# Patient Record
Sex: MALE | Race: WHITE | NOT HISPANIC OR LATINO | URBAN - METROPOLITAN AREA
[De-identification: names, ages, dates, MRNs, and addresses within clinical notes are randomized per-mention and may not be internally consistent; named-entity substitution may affect disease eponyms.]

---

## 2024-08-22 ENCOUNTER — MEDICAL CORRESPONDENCE (OUTPATIENT)
Dept: HEALTH INFORMATION MANAGEMENT | Facility: CLINIC | Age: 49
End: 2024-08-22
Payer: COMMERCIAL

## 2024-08-23 ENCOUNTER — REFERRAL (OUTPATIENT)
Dept: TRANSPLANT | Facility: CLINIC | Age: 49
End: 2024-08-23
Payer: COMMERCIAL

## 2024-08-23 DIAGNOSIS — K70.31 ALCOHOLIC CIRRHOSIS OF LIVER WITH ASCITES (H): ICD-10-CM

## 2024-08-23 DIAGNOSIS — K70.30 ALCOHOLIC CIRRHOSIS OF LIVER (H): Primary | ICD-10-CM

## 2024-08-23 NOTE — LETTER
Toro Petersen  [address]  [place] [address]          Dear Toro,    Thank you for your interest in the Transplant Center at [facility]. We look forward to being a part of your care team and assisting you through the transplant process.    As we discussed, your transplant coordinator is Feliciano Diaz (Liver).  You may call your coordinator at any time with questions or concerns.  Your first scheduled call will be on 9/18/2024 between 10:00 AM - 12:50 PM.  If this needs to change, call [phone].    Please complete the following.    Fill out and return the enclosed forms  Authorization for Electronic Communication  Authorization to Discuss Protected Health Information  Authorization for Release of Protected Health Information    Sign up for:  [employer]per[employer]est, access to your electronic medical record (see enclosed pamphlet)  [email or web address], a transplant education website        You can use these tools to learn more about your transplant, communicate with your care team, and track your medical details      Sincerely,      Solid Organ Transplant  [facility]     cc: Referring Physician

## 2024-08-23 NOTE — LETTER
September 18, 2024    Toro Petersen  [address]  [place] [address]    Dear Mr. Petersen,   The purpose of this letter is to let you know that on 9/18/2024 the [facility] Liver Transplant Team reviewed the results of your transplant referral.  Based on the results of your referral and the selection criteria used by our program, the decision was made to close your referral at this time.  This is because you are currently too medically healthy to require or benefit from liver transplant. With your MELD score being 11, you would be exceptionally low on the transplant waiting list.  We have ordered for you to meet with Dr. Judie Roberto at [facility] in [place] for a consultation to discuss your liver care in November. If needed, we can easily re-open your referral for transplant and proceed to a formal evaluation at any time. We have sent orders for you to get monthly labs to watch your MELD score to the [facility].  Important things you should know:  If you would like to discuss the decision, or if your medical status changes you may schedule a return visits with your doctor by calling [phone] and asking to speak to your transplant coordinator.  We recommend that you continue to follow up with your primary care doctor in order to manage your health concerns.  We want you to know that our program has physician and surgeon coverage 24 hours a day, 365 days a year. In addition, our transplant surgeons and physicians will not be on call for two or more transplant programs more than 30 miles apart unless the circumstances have been reviewed and approved by the United Network for Organ Sharing (UNOS) Membership and Professional Standards Committee (MPSC). Finally, our primary physician and primary surgeons are not designated as the primary surgeon or primary physician at more than 1 transplant hospital. If this coverage changes or there are  substantial program changes, you will be notified in writing by letter.   Attached is a letter from UNOS that describes the services and information offered to patients by UNOS and the Organ Procurement and Transplantation Network (OPTN).    Thank you for allowing us to participate in your care.  We wish you well.  Sincerely,  Feliciano Diaz RN  Pre-Liver Transplant Coordinator  [phone] (direct)  [phone] (fax)        Solid Organ Transplant  [facility]    Enclosures: UNOS Letter  cc: Care Team    The Organ Procurement and Transplantation Network Toll-free patient services line:  [phone]  Your resource for organ transplant information    Staffed 8:30 am - 5:00 pm ET Monday - Friday Leave a message 24/7 to receive a call back    The Organ Procurement and Transplantation Network (OPTN) is the national transplant system. It makes the policies that decide how donated organs are matched to patients waiting for a transplant. The OPTN:    Makes sure donated organs get matched to people on the transplant waiting list  Tells people about the donation and transplant processes  Makes sure that the public knows about the need for more organ and tissue donations    The OPTN has a free patient services line that you can call to:  Get more information about:  Organ donation and organ transplants  Donation and transplant policies  Get an information kit with:  A list of transplant hospitals  Waiting list information  Talk about any questions you may have about your transplant hospital or organ procurement organization. The staff will do their best to help you or point you to others who may help.  Find out how you can volunteer with the OPTN and help shape transplant policy     The patient services line number is: 1-054-616-0724    Patient services line staff CANNOT answer questions about your own medical care, including:  Waiting list status  Test  results  Medical records  You will need to call your transplant hospital for this information.    The following websites have more information about transplantation and donation:    OPTN: https://optn.transplant.hrsa.gov/    For potential living donors and transplant recipients:    Living with transplant: https://www.transplantliving.org/    Living donation process: https://optn.transplant.hrsa.gov/living-donation/    Financial assistance: https://www.livingdonorassistance.org/    Transplantation data: https://www.srtr.org/    Organ donation: https://www.organdonor.gov/    Volunteer with the OPTN: https://optn.transplant.hrsa.gov/get-involved/

## 2024-08-23 NOTE — LETTER
PHYSICIAN ORDERS    Faxed to [facility], Attn: Orders at [phone]  DATE & TIME ISSUED: 2024 12:16 PM  PATIENT NAME: Toro Petersen   : 1975     [facility] MR# : [id number]     DIAGNOSIS:  Alcoholic cirrhosis of liver with ascites  ICD-10 CODE: K70.31     Orders  1 year after issue. Please enter as Standing Order to be drawn Once Monthly as needed, for 12 occurrences. These labs must be drawn all together on the same day when done:   INR  (monthly)   CMP  (monthly)        CBC with platelets  (monthly)          Phosphatidyethanol (PEth) (every 3 months, 4 occurrences)        Hemoglobin A1c  (every 3 months, 4 occurrences)       ABO Rh Type & screen (order for one-time order only please)     PLEASE FAX RESULTS AS SOON AS POSSIBLE TO [phone], ATTN:Leonor  FOR CLINICAL QUESTIONS, PLEASE CALL Feliciano Diaz RN at [phone].  Thank you kindly,  .  Hepatology/Liver Transplant  Medical Director, Liver Transplantation  [facility]

## 2024-08-26 VITALS — HEIGHT: 70 IN | BODY MASS INDEX: 34.36 KG/M2 | WEIGHT: 240 LBS

## 2024-08-26 PROBLEM — R16.1 SPLENOMEGALY: Status: ACTIVE | Noted: 2020-01-08

## 2024-08-26 PROBLEM — G47.33 OSA ON CPAP: Status: ACTIVE | Noted: 2023-07-06

## 2024-08-26 PROBLEM — D69.6 THROMBOCYTOPENIA (H): Status: ACTIVE | Noted: 2020-01-06

## 2024-08-26 PROBLEM — E11.9 TYPE 2 DIABETES MELLITUS WITHOUT COMPLICATION, WITH LONG-TERM CURRENT USE OF INSULIN (H): Status: ACTIVE | Noted: 2020-05-20

## 2024-08-26 PROBLEM — K70.31 ALCOHOLIC CIRRHOSIS OF LIVER WITH ASCITES (H): Status: ACTIVE | Noted: 2020-01-23

## 2024-08-26 PROBLEM — Z79.4 TYPE 2 DIABETES MELLITUS WITHOUT COMPLICATION, WITH LONG-TERM CURRENT USE OF INSULIN (H): Status: ACTIVE | Noted: 2020-05-20

## 2024-08-26 PROBLEM — I85.10 SECONDARY ESOPHAGEAL VARICES WITHOUT BLEEDING (H): Status: ACTIVE | Noted: 2020-01-07

## 2024-08-26 RX ORDER — ICOSAPENT ETHYL 1 G/1
2000 CAPSULE ORAL
COMMUNITY
Start: 2024-02-15 | End: 2025-02-19

## 2024-08-26 RX ORDER — INSULIN GLARGINE 300 U/ML
INJECTION, SOLUTION SUBCUTANEOUS
COMMUNITY
Start: 2024-04-12

## 2024-08-26 RX ORDER — ROSUVASTATIN CALCIUM 20 MG/1
20 TABLET, COATED ORAL
COMMUNITY
Start: 2024-08-22

## 2024-08-26 RX ORDER — FUROSEMIDE 40 MG
20 TABLET ORAL
COMMUNITY
Start: 2024-04-14

## 2024-08-26 RX ORDER — MULTIVIT WITH MINERALS/LUTEIN
1000 TABLET ORAL
COMMUNITY

## 2024-08-26 RX ORDER — PROPRANOLOL HCL 60 MG
1 CAPSULE, EXTENDED RELEASE 24HR ORAL DAILY
COMMUNITY
Start: 2024-07-18

## 2024-08-26 RX ORDER — SPIRONOLACTONE 50 MG/1
50 TABLET, FILM COATED ORAL
COMMUNITY
Start: 2024-04-14

## 2024-08-26 RX ORDER — INSULIN ASPART 100 [IU]/ML
INJECTION, SOLUTION INTRAVENOUS; SUBCUTANEOUS
COMMUNITY
Start: 2024-05-16

## 2024-08-26 RX ORDER — MULTIVIT-MIN/IRON/FOLIC ACID/K 18-600-40
CAPSULE ORAL
COMMUNITY

## 2024-08-26 NOTE — TELEPHONE ENCOUNTER
Patient was asked the following questions during liver intake call.     SOT LIVER INTAKE      Referring Provider: Kateryna Gardner MD   Referring Diagnosis: Alcholic cirrhosis of Liver  PCP:  Kateryna Gardner MD      1)Do you know why you have liver disease:Yes      If Alcoholic Cirrhosis is present when was your last drink:  12/2019      Have you ever been through treatment for alcohol: Yes, in 1996 AA classes  2) Presence of Ascites: No Paracentesis: No  3) Presence of Hepatic Encephalopathy: no  Medications: propranolol,rosuvastatin,vitamin E,  furosemide      4) History of GI Bleeding: No  5) Oxygen Use: No  6) EGD: 2/27/2024  7) Colonoscopy: 9/11/2023  8) MELD Score: 11  9)  Labs available for review from PCP/GI:  yes  10)HCC Diagnosis: No (if no, go to #11)                                          11)Insurance information: [facility] [id number] Group [id number]                 Referral intake process completed.  Patient is aware that after financial approval is received, medical records will be requested.   Patient confirmed for a callback from transplant coordinator on 9/18/2024.  Tentative evaluation date TBD.     Confirmed coordinator will discuss evaluation process in more detail at the time of their call.   Patient is aware of the need to arrange age appropriate cancer screening, vaccinations, and dental care.  Reminded patient to complete questionnaire, complete medical records release, and review packet prior to evaluation visit .  Assessed patient for special needs (ie--wheelchair, assistance, guardian, and ): No  Patient instructed to call [phone] with questions.      Patient gave verbal consent during intake call to obtain medical records and documents outside of [facility]:   Yes

## 2024-09-18 NOTE — TELEPHONE ENCOUNTER
"September 18, 2024 10:42 AM - Feliciano Diaz RN:   \"Jaskaran\" Sec[place]  LIVER DISEASE ETOH cirrhosis with ascites  REFERRING PROVIDER: Kateryna Gardner MD   PRIMARY CARE PROVIDER: Kateryna Gardner MD   HEALTH SYSTEM: [facility]  INSURANCE: [employer]  -----------------------------------------------------------------------------------------------------------------------------  MELD 11 (Na 140, Cr 0.68, Albumin 4, T-Bili 1.7, INR 1.3) on 8/19/24       ABO O       HISTORY OF LIVER DISEASE  FIRST DX WITH LIVER DISEASE  1/2020  REASON FOR DIAGNOSIS shut down at work, had BLE swelling    Ascites: yes taking 20mg furosemide every day and 50mg spironolactone every day  Ancelmo NO  TIPS NO  SBP NO  HE/Meds: No HE, No Meds  Kidney function/Dialysis: CKD stage 2, counseled by PCP on avoidance of NSAIDS; no dialysis  Variceal screening Last EGD 2/27/24 at [facility], Yes Varices w/ banding, significant portal HTN w/ splenomegaly. Rec follow up annually.  CRC Screening: [place] done 9/11/2023, normal, repeat in 10 years.  GI Bleed/Hematemesis: Yes GIB, No Hematemesis  Willing to accept blood products/transfusions: Yes has had previous transfusions  HCC Screening US abdomen limit 8/23/24 at [facility] = no focal lesions  Alcohol/Drug use/Legal issues/Chem Dep: last drink 12/2019, mostly drank beer, drank 12-18/week, no drug use, DUI X1, did some classes after DUI    Hospitalizations: hospitalized in 2019 with edema, b  ---------------------------------------------------------------------------------------------------------------------------------  PM  Abdominal surgery: none  Cardiac: \"non compliant with anti lipid therapy\" per 8/22/24 PCP note, Crestor restarted.  Pulmonary/Smoking/O2: MONTSE on CPAP, asthma, never smoked, uses chew, discussed cessation.  Cancer: none  Vaccinations: Vaccinated for Hep A & B, not vaccinated for Covid  Diabetes: Yes DM2, checks BG continuous monitoring, taking insulin, worsening control  Nutrition: appetite fluctuates, slight " weight gain due to inactivity  Dentist: does not have a dentist, last check up 2019.   Mental Health: having some anxiety, no medications/no therapy, talks about it with family.    PAULA lives in [place]. Originally from [place].  Family/social support: single, lives with alone, 2 kids (18 & 19), Mother Mary. Father passed from pancreatic cancer  Work: Not currently working. Last worked 12/2019, was a  at [employer]. Filed for disability.  Education: 12 grade.    ---------------------------------------------------------------------------------------------------------------------------------  LDLT Discussed: yes, discussed in detail, no potential donors immediately identified.    PLAN will plan for Jaskaran to see Dr. Judie Roberto at [facility] on her November schedule, tentatively planning for 11/20/24. Will close referral for now and re-open if needed. Emailed patient educational video links and Living Donor information. Faxed lab orders to [facility], patient will get labs monthly to monitor MELD.  CONTACT VIA PHONE OR [employer] Either works for patient.

## 2024-09-23 NOTE — TELEPHONE ENCOUNTER
"Below from Dr. Roberto about patient's referral:    \"Hi team,   Dr. Cohen ([facility]) called me about this patient, asking if anticoagulation is needed for partial PVT. Patient is being referred for LT. Can you please get images and plan for review with radiology and surgery.     Judie Roberto MD, MPH\"          "

## 2024-10-14 ENCOUNTER — DOCUMENTATION ONLY (OUTPATIENT)
Dept: TRANSPLANT | Facility: CLINIC | Age: 49
End: 2024-10-14
Payer: COMMERCIAL

## 2024-10-14 NOTE — PROGRESS NOTES
[facility] LIVER TUMOR BOARD NOTE  Patient discussed at the multidisciplinary tumor board on 10-. The attendees may consist of representatives from hepatology, oncology, radiation oncology, surgical subspecialty including liver transplant and radiology.     DATE OF TUMOR BOARD: October 11, 2024      SCAN REVIEWED:  Reviewed by Dr. Charles Dimas    Discussion:   -thrombus extends from supermesenteric vein, just about into intrahepatic tip  -significant, non-occlusive, other varices developed  -no lesions seen    Plan: Will review plan for treatment with Dr. Roberto and Dr. Chris Cohen

## 2024-11-18 LAB
ABO/RH(D): NORMAL
ANTIBODY SCREEN: NEGATIVE
SPECIMEN EXPIRATION DATE: NORMAL

## 2024-11-19 ENCOUNTER — OFFICE VISIT (OUTPATIENT)
Dept: TRANSPLANT | Facility: CLINIC | Age: 49
End: 2024-11-19
Attending: INTERNAL MEDICINE
Payer: COMMERCIAL

## 2024-11-19 ENCOUNTER — LAB (OUTPATIENT)
Dept: LAB | Facility: CLINIC | Age: 49
End: 2024-11-19
Attending: INTERNAL MEDICINE
Payer: COMMERCIAL

## 2024-11-19 ENCOUNTER — HOSPITAL ENCOUNTER (OUTPATIENT)
Dept: CARDIOLOGY | Facility: CLINIC | Age: 49
Discharge: HOME | End: 2024-11-19
Attending: INTERNAL MEDICINE
Payer: COMMERCIAL

## 2024-11-19 ENCOUNTER — COMMITTEE REVIEW (OUTPATIENT)
Dept: TRANSPLANT | Facility: CLINIC | Age: 49
End: 2024-11-19

## 2024-11-19 VITALS
HEIGHT: 70 IN | DIASTOLIC BLOOD PRESSURE: 66 MMHG | HEART RATE: 80 BPM | OXYGEN SATURATION: 95 % | WEIGHT: 247.1 LBS | BODY MASS INDEX: 35.37 KG/M2 | SYSTOLIC BLOOD PRESSURE: 97 MMHG | RESPIRATION RATE: 16 BRPM

## 2024-11-19 DIAGNOSIS — K70.31 ALCOHOLIC CIRRHOSIS OF LIVER WITH ASCITES (H): ICD-10-CM

## 2024-11-19 DIAGNOSIS — K70.31 ALCOHOLIC CIRRHOSIS OF LIVER WITH ASCITES (H): Primary | ICD-10-CM

## 2024-11-19 DIAGNOSIS — E66.812 CLASS 2 SEVERE OBESITY DUE TO EXCESS CALORIES WITH SERIOUS COMORBIDITY AND BODY MASS INDEX (BMI) OF 36.0 TO 36.9 IN ADULT (H): ICD-10-CM

## 2024-11-19 DIAGNOSIS — Z76.82 ORGAN TRANSPLANT CANDIDATE: Primary | ICD-10-CM

## 2024-11-19 DIAGNOSIS — K76.6 PORTAL HYPERTENSION (H): ICD-10-CM

## 2024-11-19 DIAGNOSIS — K70.30 ALCOHOLIC CIRRHOSIS OF LIVER (H): ICD-10-CM

## 2024-11-19 DIAGNOSIS — E66.01 CLASS 2 SEVERE OBESITY DUE TO EXCESS CALORIES WITH SERIOUS COMORBIDITY AND BODY MASS INDEX (BMI) OF 36.0 TO 36.9 IN ADULT (H): ICD-10-CM

## 2024-11-19 LAB
A1 AB TITR SERPL: >256 {TITER}
ABO/RH(D): NORMAL
AFP SERPL-MCNC: 6 NG/ML
ALBUMIN MFR UR ELPH: <6 MG/DL
ALBUMIN SERPL BCG-MCNC: 3.8 G/DL (ref 3.5–5.2)
ALBUMIN UR-MCNC: NEGATIVE MG/DL
ALP SERPL-CCNC: 167 U/L (ref 40–150)
ALT SERPL W P-5'-P-CCNC: ABNORMAL U/L
ANION GAP SERPL CALCULATED.3IONS-SCNC: 24 MMOL/L (ref 7–15)
ANTIBODY TITER IGM SCREEN: NEGATIVE
APPEARANCE UR: CLEAR
AST SERPL W P-5'-P-CCNC: ABNORMAL U/L
B IGG TITR SERPL: 64 {TITER}
B IGM TITR SERPL: 16 {TITER}
BILIRUB DIRECT SERPL-MCNC: ABNORMAL MG/DL
BILIRUB SERPL-MCNC: 1 MG/DL
BILIRUB UR QL STRIP: NEGATIVE
BUN SERPL-MCNC: 12.1 MG/DL (ref 6–20)
CALCIUM SERPL-MCNC: 9 MG/DL (ref 8.8–10.4)
CHLORIDE SERPL-SCNC: 103 MMOL/L (ref 98–107)
CHOLEST SERPL-MCNC: 204 MG/DL
CMV IGG SERPL IA-ACNC: <0.2 U/ML
CMV IGG SERPL IA-ACNC: NORMAL
COLOR UR AUTO: ABNORMAL
CREAT SERPL-MCNC: 0.8 MG/DL (ref 0.67–1.17)
CREAT UR-MCNC: 31.9 MG/DL
EBV VCA IGG SER IA-ACNC: >750 U/ML
EBV VCA IGG SER IA-ACNC: POSITIVE
EGFRCR SERPLBLD CKD-EPI 2021: >90 ML/MIN/1.73M2
ERYTHROCYTE [DISTWIDTH] IN BLOOD BY AUTOMATED COUNT: 13 % (ref 10–15)
EST. AVERAGE GLUCOSE BLD GHB EST-MCNC: 180 MG/DL
FASTING STATUS PATIENT QL REPORTED: ABNORMAL
FASTING STATUS PATIENT QL REPORTED: ABNORMAL
FERRITIN SERPL-MCNC: 452 NG/ML (ref 31–409)
GLUCOSE SERPL-MCNC: 434 MG/DL (ref 70–99)
GLUCOSE UR STRIP-MCNC: 1000 MG/DL
HAV AB SER QL IA: REACTIVE
HBA1C MFR BLD: 7.9 %
HBV CORE AB SERPL QL IA: NONREACTIVE
HBV SURFACE AB SERPL IA-ACNC: <3.5 M[IU]/ML
HBV SURFACE AB SERPL IA-ACNC: NONREACTIVE M[IU]/ML
HBV SURFACE AG SERPL QL IA: NONREACTIVE
HCO3 SERPL-SCNC: 9 MMOL/L (ref 22–29)
HCT VFR BLD AUTO: 39.3 % (ref 40–53)
HCV AB SERPL QL IA: NONREACTIVE
HDLC SERPL-MCNC: 20 MG/DL
HGB BLD-MCNC: ABNORMAL G/DL
HGB UR QL STRIP: NEGATIVE
HIV 1+2 AB+HIV1 P24 AG SERPL QL IA: NONREACTIVE
INR PPP: 1.91 (ref 0.85–1.15)
IRON BINDING CAPACITY (ROCHE): NORMAL
IRON SATN MFR SERPL: NORMAL %
IRON SERPL-MCNC: 82 UG/DL (ref 61–157)
KETONES UR STRIP-MCNC: NEGATIVE MG/DL
LDLC SERPL CALC-MCNC: ABNORMAL MG/DL
LEUKOCYTE ESTERASE UR QL STRIP: NEGATIVE
LVEF ECHO: NORMAL
MCH RBC QN AUTO: ABNORMAL PG
MCHC RBC AUTO-ENTMCNC: ABNORMAL G/DL
MCV RBC AUTO: 89 FL (ref 78–100)
NITRATE UR QL: NEGATIVE
NONHDLC SERPL-MCNC: 184 MG/DL
PH UR STRIP: 5.5 [PH] (ref 5–7)
PHOSPHATE SERPL-MCNC: 3.4 MG/DL (ref 2.5–4.5)
PLATELET # BLD AUTO: 52 10E3/UL (ref 150–450)
POTASSIUM SERPL-SCNC: 4.6 MMOL/L (ref 3.4–5.3)
PROT SERPL-MCNC: 6.9 G/DL (ref 6.4–8.3)
PROT/CREAT 24H UR: NORMAL MG/G{CREAT}
PSA SERPL DL<=0.01 NG/ML-MCNC: 0.31 NG/ML (ref 0–3.5)
RBC # BLD AUTO: 4.44 10E6/UL (ref 4.4–5.9)
SODIUM SERPL-SCNC: 136 MMOL/L (ref 135–145)
SP GR UR STRIP: 1.02 (ref 1–1.03)
SPECIMEN EXPIRATION DATE: NORMAL
SPECIMEN EXPIRATION DATE: NORMAL
T PALLIDUM AB SER QL: NONREACTIVE
TRIGL SERPL-MCNC: 1678 MG/DL
TSH SERPL DL<=0.005 MIU/L-ACNC: 1.8 UIU/ML (ref 0.3–4.2)
UROBILINOGEN UR STRIP-MCNC: NORMAL MG/DL
WBC # BLD AUTO: 4.6 10E3/UL (ref 4–11)

## 2024-11-19 PROCEDURE — 84155 ASSAY OF PROTEIN SERUM: CPT | Performed by: PATHOLOGY

## 2024-11-19 PROCEDURE — 86644 CMV ANTIBODY: CPT | Performed by: INTERNAL MEDICINE

## 2024-11-19 PROCEDURE — G0480 DRUG TEST DEF 1-7 CLASSES: HCPCS | Mod: 90 | Performed by: PATHOLOGY

## 2024-11-19 PROCEDURE — 80069 RENAL FUNCTION PANEL: CPT | Performed by: PATHOLOGY

## 2024-11-19 PROCEDURE — 93000 ELECTROCARDIOGRAM COMPLETE: CPT | Performed by: INTERNAL MEDICINE

## 2024-11-19 PROCEDURE — 86900 BLOOD TYPING SEROLOGIC ABO: CPT

## 2024-11-19 PROCEDURE — 86886 COOMBS TEST INDIRECT TITER: CPT

## 2024-11-19 PROCEDURE — 83036 HEMOGLOBIN GLYCOSYLATED A1C: CPT | Performed by: INTERNAL MEDICINE

## 2024-11-19 PROCEDURE — 82728 ASSAY OF FERRITIN: CPT | Performed by: PATHOLOGY

## 2024-11-19 PROCEDURE — 85041 AUTOMATED RBC COUNT: CPT | Performed by: PATHOLOGY

## 2024-11-19 PROCEDURE — 87340 HEPATITIS B SURFACE AG IA: CPT | Performed by: INTERNAL MEDICINE

## 2024-11-19 PROCEDURE — 86706 HEP B SURFACE ANTIBODY: CPT | Performed by: INTERNAL MEDICINE

## 2024-11-19 PROCEDURE — 82105 ALPHA-FETOPROTEIN SERUM: CPT | Performed by: INTERNAL MEDICINE

## 2024-11-19 PROCEDURE — 85014 HEMATOCRIT: CPT | Performed by: PATHOLOGY

## 2024-11-19 PROCEDURE — 85048 AUTOMATED LEUKOCYTE COUNT: CPT | Performed by: PATHOLOGY

## 2024-11-19 PROCEDURE — 82247 BILIRUBIN TOTAL: CPT | Performed by: PATHOLOGY

## 2024-11-19 PROCEDURE — 86850 RBC ANTIBODY SCREEN: CPT

## 2024-11-19 PROCEDURE — 86481 TB AG RESPONSE T-CELL SUSP: CPT | Performed by: INTERNAL MEDICINE

## 2024-11-19 PROCEDURE — 86708 HEPATITIS A ANTIBODY: CPT | Performed by: INTERNAL MEDICINE

## 2024-11-19 PROCEDURE — 83550 IRON BINDING TEST: CPT | Performed by: PATHOLOGY

## 2024-11-19 PROCEDURE — 93306 TTE W/DOPPLER COMPLETE: CPT

## 2024-11-19 PROCEDURE — G0103 PSA SCREENING: HCPCS | Performed by: PATHOLOGY

## 2024-11-19 PROCEDURE — G0463 HOSPITAL OUTPT CLINIC VISIT: HCPCS | Performed by: TRANSPLANT SURGERY

## 2024-11-19 PROCEDURE — 86665 EPSTEIN-BARR CAPSID VCA: CPT | Performed by: INTERNAL MEDICINE

## 2024-11-19 PROCEDURE — 83540 ASSAY OF IRON: CPT | Performed by: PATHOLOGY

## 2024-11-19 PROCEDURE — 36415 COLL VENOUS BLD VENIPUNCTURE: CPT | Performed by: PATHOLOGY

## 2024-11-19 PROCEDURE — 86803 HEPATITIS C AB TEST: CPT | Performed by: INTERNAL MEDICINE

## 2024-11-19 PROCEDURE — 86704 HEP B CORE ANTIBODY TOTAL: CPT | Performed by: INTERNAL MEDICINE

## 2024-11-19 PROCEDURE — 86780 TREPONEMA PALLIDUM: CPT | Performed by: INTERNAL MEDICINE

## 2024-11-19 PROCEDURE — 84443 ASSAY THYROID STIM HORMONE: CPT | Performed by: PATHOLOGY

## 2024-11-19 PROCEDURE — 84075 ASSAY ALKALINE PHOSPHATASE: CPT | Performed by: PATHOLOGY

## 2024-11-19 PROCEDURE — 85610 PROTHROMBIN TIME: CPT | Performed by: PATHOLOGY

## 2024-11-19 PROCEDURE — 85049 AUTOMATED PLATELET COUNT: CPT | Performed by: PATHOLOGY

## 2024-11-19 PROCEDURE — 80061 LIPID PANEL: CPT | Performed by: PATHOLOGY

## 2024-11-19 PROCEDURE — 99000 SPECIMEN HANDLING OFFICE-LAB: CPT | Performed by: PATHOLOGY

## 2024-11-19 NOTE — PROGRESS NOTES
"\"Much or all of the text in this note was generated through the use of Dragon Dictate voice to text software. Errors in spelling or words which appear to be out of context are unintentional, may be present due having escaped editing\"    Assessment and Plan:  1. liver transplant evaluation - patient is a good candidate overall. Benefits and surgical risks of a liver transplantation were discussed.  2.  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 Portal Vein:Portal vein thrombus - partial, on anticoagulation and SMV open  2. Hepatic Artery: Open  3. TIPS: absent  4. Previous Abdominal Surgery: No  5. Hepatocellular Carcinoma: None  6. Ascites: Present - minimal  7. Costal Angle: wide  8. Portopulmonary Hypertension: absent  9. Hepatopulmonary Syndrome: absent  10. Cardiac Evaluation: needs stress echocardiogram  11. Nutritional Status: Moderate  12. Diabetes: yes, type II on insulin  13.Hypertension no  14. Smoker:no  14: Fraility index:no  15. Meets guidelines to receive Living Donor  No, due his weight >110 Kg  16. Potential Living donors No  MELD 3.0: 13 at 11/19/2024  7:19 AM  MELD-Na: 15 at 11/19/2024  7:19 AM  Calculated from:  Serum Creatinine: 0.8 mg/dL (Using min of 1 mg/dL) at 11/19/2024  7:19 AM  Serum Sodium: 136 mmol/L at 11/19/2024  7:19 AM  Total Bilirubin: 1 mg/dL at 11/19/2024  7:19 AM  Serum Albumin: 3.8 g/dL (Using max of 3.5 g/dL) at 11/19/2024  7:19 AM  INR(ratio): 1.91 at 11/19/2024  7:19 AM  Age at listing (hypothetical): 49 years  Sex: Male at 11/19/2024  7:19 AM      Recommendations: None  #1.  Full cardiac workup in view of his diabetes and obesity  #2.  Consider losing weight to meet guidelines for living donor transplant  #3.  His MELD sodium today is 15 and would certainly qualify for listing at this time      Patients overall evaluation will be discussed at the Liver Transplant selection committee meeting with a final recommendation on the patients suitability for " transplant to be made at that time.    Consult Full  Details:  Toro Petersen was seen in consultation at the request of Dr. Loi Christine and Dr. Judie Roberto for evaluation as a potential liver transplant recipient.    Reason for Visit:  Toro Petersen is a 49 year old year old male with metabolic associated liver disease, who presents for liver transplant evaluation.    HPI:  Presenting complaint: Encephatopathy     Patient was diagnosed to have liver disease approximately in January 2024.  He was seen for variceal bleeding.  Initially he received blood transfusions and subsequently Dr. Loi Christine did an endoscopy and banding for him.  He is also noticed encephalopathy which is disabling.  He has very minimal ascites.  No spontaneous bacterial peritonitis.  He does have easy fatigability and weakness.  He is not currently working since 2020.  He feels the liver disease is disabling his quality of life.  He has tried Ozempic in the past with good weight loss.    He does not give any history of chest pain or any symptoms of coronary artery disease.  No history of lung disease.  He is otherwise very active.    He did not have a caregiver at this time but says that he has a son that could certainly help him.      No past medical history on file.  No past surgical history on file.  No past surgical history on file.  No family history on file.  Allergies   Allergen Reactions    Aspirin Difficulty breathing     Prior to Admission medications    Medication Sig Start Date End Date Taking? Authorizing Provider   furosemide (LASIX) 40 MG tablet Take 20 mg by mouth. 4/14/24   Reported, Patient   icosapent ethyl (VASCEPA) 1 g CAPS capsule Take 2,000 mg by mouth. 2/15/24 2/19/25  Reported, Patient   insulin aspart (NOVOLOG FLEXPEN) 100 UNIT/ML pen INJECT 36 UNITS UNDER THE SKIN 3 TIMES PER DAY BEFORE MEALS 5/16/24   Reported, Patient   insulin glargine U-300 (TOUJEO SOLOSTAR) 300 UNIT/ML (1 units dial) pen INJECT 80  "UNITS UNDER THE SKIN EVERY NIGHT AT BEDTIME 4/12/24   Reported, Patient   propranolol ER (INDERAL LA) 60 MG 24 hr capsule Take 1 capsule by mouth daily. 7/18/24   Reported, Patient   rosuvastatin (CRESTOR) 20 MG tablet Take 20 mg by mouth. 8/22/24   Reported, Patient   spironolactone (ALDACTONE) 50 MG tablet Take 50 mg by mouth. 4/14/24   Reported, Patient   Vitamin D, Cholecalciferol, 50 MCG (2000 UT) CAPS Take by mouth.    Reported, Patient   vitamin E (TOCOPHEROL) 1000 units (450 mg) capsule Take 1,000 Units by mouth.    Reported, Patient       Previous Transplant Hx: No    Cardiovascular Hx:       h/o Cardiac Issues: No       Exercise Tolerance: shortness of breath with exertion.    Potential Donor(s): No    ROS:    REVIEW OF SYSTEMS (check box if normal)  [x]                GENERAL  [x]                  PULMONARY [x]                 GENITOURINARY  [x]                 CNS                 [x]                  CARDIAC  [x]                  ENDOCRINE  [x]                 EARS,NOSE,THROAT [x]                  GASTROINTESTINAL [x]                  NEUROLOGIC    [x]                 MUSCLOSKELTAL  [x]                   HEMATOLOGY    Examination:     Vitals:  BP 97/66 (BP Location: Left arm, Patient Position: Sitting, Cuff Size: Adult Large)   Pulse 80   Resp 16   Ht 1.765 m (5' 9.5\")   Wt 112.1 kg (247 lb 1.6 oz)   SpO2 95%   BMI 35.97 kg/m      GENERAL APPEARANCE: alert and no distress  EYES: PERRL  HENT: mouth without ulcers or lesions  NECK: supple, no adenopathy  RESP: lungs clear to auscultation - no rales, rhonchi or wheezes  CV: regular rhythm, normal rate, no rub   ABDOMEN:  soft, nontender, no HSM or masses and bowel sounds normal  MS: extremities normal- no gross deformities noted, no evidence of inflammation in joints, no muscle tenderness  SKIN: no rash  NEURO: Normal strength and tone, sensory exam grossly normal, mentation intact and speech normal  PSYCH: mentation appears normal. and affect " normal/bright      Results:   Recent Results (from the past week)   Lipid Profile    Collection Time: 11/19/24  7:19 AM   Result Value Ref Range    Cholesterol 204 (H) <200 mg/dL    Triglycerides 1,678 (H) <150 mg/dL    Direct Measure HDL 20 (L) >=40 mg/dL    LDL Cholesterol Calculated      Non HDL Cholesterol 184 (H) <130 mg/dL    Patient Fasting > 8hrs? Unknown    Basic metabolic panel    Collection Time: 11/19/24  7:19 AM   Result Value Ref Range    Sodium 136 135 - 145 mmol/L    Potassium 4.6 3.4 - 5.3 mmol/L    Chloride 103 98 - 107 mmol/L    Carbon Dioxide (CO2) 9 (LL) 22 - 29 mmol/L    Anion Gap 24 (H) 7 - 15 mmol/L    Urea Nitrogen 12.1 6.0 - 20.0 mg/dL    Creatinine 0.80 0.67 - 1.17 mg/dL    GFR Estimate >90 >60 mL/min/1.73m2    Calcium 9.0 8.8 - 10.4 mg/dL    Glucose 434 (H) 70 - 99 mg/dL    Patient Fasting > 8hrs? Unknown    Hepatic panel    Collection Time: 11/19/24  7:19 AM   Result Value Ref Range    Protein Total 6.9 6.4 - 8.3 g/dL    Albumin 3.8 3.5 - 5.2 g/dL    Bilirubin Total 1.0 <=1.2 mg/dL    Alkaline Phosphatase 167 (H) 40 - 150 U/L    AST      ALT      Bilirubin Direct     Ferritin    Collection Time: 11/19/24  7:19 AM   Result Value Ref Range    Ferritin 452 (H) 31 - 409 ng/mL   Hemoglobin A1c    Collection Time: 11/19/24  7:19 AM   Result Value Ref Range    Estimated Average Glucose 180 (H) <117 mg/dL    Hemoglobin A1C 7.9 (H) <5.7 %   Iron and iron binding capacity    Collection Time: 11/19/24  7:19 AM   Result Value Ref Range    Iron 82 61 - 157 ug/dL    Iron Binding Capacity      Iron Sat Index     Phosphorus    Collection Time: 11/19/24  7:19 AM   Result Value Ref Range    Phosphorus 3.4 2.5 - 4.5 mg/dL   Prostate spec antigen screen    Collection Time: 11/19/24  7:19 AM   Result Value Ref Range    Prostate Specific Antigen Screen 0.31 0.00 - 3.50 ng/mL   TSH with free T4 reflex    Collection Time: 11/19/24  7:19 AM   Result Value Ref Range    TSH 1.80 0.30 - 4.20 uIU/mL   INR     Collection Time: 11/19/24  7:19 AM   Result Value Ref Range    INR 1.91 (H) 0.85 - 1.15   CBC with platelets    Collection Time: 11/19/24  7:19 AM   Result Value Ref Range    WBC Count 4.6 4.0 - 11.0 10e3/uL    RBC Count 4.44 4.40 - 5.90 10e6/uL    Hemoglobin      Hematocrit 39.3 (L) 40.0 - 53.0 %    MCV 89 78 - 100 fL    MCH      MCHC      RDW 13.0 10.0 - 15.0 %    Platelet Count 52 (L) 150 - 450 10e3/uL   Adult Type and Screen    Collection Time: 11/19/24  7:19 AM   Result Value Ref Range    ABO/RH(D) O POS     Antibody Screen Negative Negative    SPECIMEN EXPIRATION DATE [id number]    ABO and Rh    Collection Time: 11/19/24  7:28 AM   Result Value Ref Range    ABO/RH(D) O POS     SPECIMEN EXPIRATION DATE [id number]      I had a long discussion with the patient regarding liver transplantation which included but was not limited to  the following points:    Liver transplant selection committee process.  The federal rules for cadaveric waiting list, the size and blood type matching of the organ. The availability of living-related donor transplantation.  The types of donors: brain death donors, non-heart beating donors, partial liver grafts: splits and living donor grafts  Extended criteria  Donors (older age, steasosis) and the increased  risk of primary non-function using the extended criteria donors  [facility] high risk donors,  Risk of donor transmitted infections and donor transmitted malignancy  The liver transplant operation and the associated risks and technical complications which can include intraoperative death, post operative death,  Primary non-function, bleeding requiring re-operations, arterial and biliary complications, bowel perforations, and intra abdominal abscess. Some of these complicaitons may require a second operation.  The postoperative course, the ICU stay and risk of postoperative complications which can include sepsis, MI, stroke, brain injury, pneumonia, pleural effusions, and renal  dysfunction.  The current 1 year and 5 year graft and patient survivals.  The need for life long immunosuppressive therapy and the side effects of these medications, including the possibility of toxicity, opportunistic infections, risk of cancer including lymphoma, and the possibility of rejection even if the patient is taking the medication exactly as prescribed.  The need for compliance with medications and follow-up visits in the clinic and thereafter.  The patient and family understand these risks and wish to proceed to transplantation     Review of prior external note(s) from - Outside records from [place]  60 minutes spent by me on the date of the encounter doing chart review, history and exam, documentation and further activities per the note

## 2024-11-19 NOTE — COMMITTEE REVIEW
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    Committee Discussion Details: Complete Evaluation.     Needs:   Doppler US every 3 months.   Endocrinology referral to be done locally.  Needs to lose weight, check with insurance to see if he can get back on meds.   Needs caregiver plan.   Better DM control.  RTC with Dr. Roberto 2/11.    Committee Review Members:  Nutrition Yumiko Mcpherson, RD, Linda Whitten, RD   Pharmacist Kamila Perez, [facility]    - Clinical Kathy Hightower MSW, MARCO Farley, Amy Rodriges, [facility]   Transplant Samantha Tate, RN, Angelina Soto, RN, Apurva Ferreira, RN, Jr Abebe Cee, NICHO, Klaus Mckenzie, RN, Feliciano Diaz, RN, Rhonda Coffey, RN   Transplant Hepatology  Yuliya Phillips MD, Judie Roberto MD, Antonietta Rutledge MD, Ghislaine De La Cruz MD, Thomas M. Leventhal, MD   Transplant Surgery Mak Longo MD, Manjinder Gallegos MD, Ramon uW MD, Alvin Junior MD

## 2024-11-19 NOTE — PROGRESS NOTES
Psychosocial Assessment    Toro Petersen was seen in the Transplant Center as part of his evaluation as a potential liver transplant recipient .  He presented with alone today in clinic.     Living Situation: Toro is a 49 year old male who lives in a single family home in  [place], [place]. Toro resides with his his adult son, Vineet (19). He does not report any concerns related to their living environment.     [place], [place] is about 350 miles away from our transplant center. We discussed requirement to remain within 50 miles of our transplant center post discharge from the hospital for about a month with a caregiver. We reviewed lodging and funding options. At this time, they do not have an adequate plan to remain local post transplant.  Obie has funding through his insurance that could assist with a local stay. He has a friend and family member who live closer who he will speak to about staying with them. He also does not have a caregiver for his local stay.     Education/Employment:  Toro  obtained his GED . He is no longer working due to their health. He last worked in 2019 as a  . Toro is not a .     Financial /Income: Obie does not have a source of income at this time. He reports being financially stable. He indicated that he is able to financially sustain himself and family for about 10 more years.     Health Insurance: He has [employer].     This writer talked with Toro about the financial risks of transplant, particularly about the high cost of transplant related medications and the importance of maintaining adequate health insurance coverage.    Family/Social Support: Toro is single and has two children, Vineet who lives with him and Hayley who is enlisted in the . He has one sister, Nuvia ([place], [place]). His mother Mary is living and lives between his sisters home and his home. He identified his nephew, Forrest and friend Sumanth who are supportive.     At this  time, he does not have an appropriate caregiver plan.      This writer stressed the importance of having a stable and involved support network before and after transplant.  Provided Toro with education about the relationship between a stable support system and better surgical and post-transplant outcomes compared to patients with a limited support system.      Functional Status: Toro is independent with all ADL/IADLs. He manages their medications. . He continues to drive.     Chemical Dependency:  Obie's last consumption of alcohol was December 2019. Prior to that he was drinking 2-3 days a week, and would fluctuate between 6-24 beers a day. He reports that he discontinued his drinking due to his health. Obie continues to chew tobacco and goes through about five tins a month. No history of misuse/over use of pain medication. No other illicit substance use reported other than recreational marijuana use in his teens.     Obie does not have a history of any treatment related to substances. He has a history of one DUI.     Mental Health: Toro denies any history of being diagnosed with a mental health disorder. he has no history of seeing a therapist or being prescribed medication for his mental health.  Toro denies any history of hospitalization for mental health treatment.       Adjustment to Illness:  This writer provided Toro with supportive counseling throughout this interview.  This writer also encouraged Toro to attend the liver transplant support group for additional support and encouragement.     Impression/Recommendations:   Toro verbalizes understanding the psychosocial risks of transplant and teaching provided during this evaluation.    Toro last consumption of alcohol was December 2019. He has meet the sobriety criteria recommended for listing. At this time, there are no further recommendations for a substance use assessment or treatment as he is about 5 years sober. Lillian  from 9/25 was negative. Today his Peth is pending.     Obie does not have a caregiver plan at this time. He will discuss this with his family and friends.     Toro has adequate finances and health insurance for transplant, and an intact support system. This writer will remain available to assist patient throughout the evaluation process and will follow patient through transplant if he is listed.  It was a pleasure to evaluate this patient for liver transplant.    Pending his caregiver plan, Toro is a acceptable transplant candidate from a psychosocial perspective.      Teaching completed during assessment:  1.     Housing and relocation needs post transplant.  2.     Caregiver needs post transplant.  3.     Financial issues related to transplant.  4.     Risks of alcohol use post transplant.  5.     Common psychosocial stressors pre/post transplant.        6.     Liver Transplant support group availability.        7.     [employer] Directive - I discussed a health care directive with him today. He has one at [facility] and reports that he named his nephew as his primary health care agent.              Psychosocial Risks of Transplant Reviewed:  1.     Increased stress related to your emotional, family, social, employment, or   financial situation.  2.     Affect on work and/or disability benefits.  3.     Affect on future health and life insurance.  4.     Transplant outcome expectations may not be met.  5.     Mental Health risks: anxiety, depression, PTSD, guilt, grief and chronic fatigue.     IGANNI Dorado

## 2024-11-19 NOTE — LETTER
"11/19/2024      Toro Petersen  [address]   [address]  [place] [address]      Dear Colleague,    Thank you for referring your patient, Toro Petersen, to the [facility] TRANSPLANT [facility]. Please see a copy of my visit note below.    [facility] Liver Transplant Evaluation    Requesting Provider and Health System: Chris Piastephanie  Liver Disease: ARLD    A/P  Mr. Petersen 49 year old male with alcohol related cirrhosis. MELD 13 ABO O      HE His CC with liver disease is \"brain fog\". In response to this, I am weaning him off NSBB, adding rifaximin. He already has 4-5 BM per day, so will not add lactulose.    PV THROMBOSIS, PARTIAL stay on warfarin x 3 mo, goal INR 2-3. He will be on for 3 mo then will reassess with US with dopplers.    THROMBOCYTOPENIA 2/2 portal HTN. Plt ~50s.    CRAMPING  1. tonic water (diet)  2. magnesium supplement  3. zinc 220 bid  4. vitamin E 200 mg po tid  If none of these works, will have to cut back on diuretics.    HCC SCREENING UTD CT 9/2024. No mass. US today.  VARICEAL SCREENING UTD with small varices 2/2024. I am stopping his NSBB and he will get next EGD 2/2025.    IRON TESTING Noted ferritin 452, and away from alcohol for 5 y. Sat 11 2/15/24.    PORTAL HTN He has had EV bleed 5 years ago and now with grade 1 varices. Given his symptoms of dizziness, black spots I would like him to wean off the propranolol. He is on Inderal LA 60. I have prescribed inderal 20 mg (not long acting) and have instructed him to take 20 bid for 1 week, then decrease to 20 mg daily.     BONE HEALTH no assessment. DEXA scheduled 2/2025  DENTAL HEALTH Poor dentition. Last dental visit 2019. Needs dental care.   COLORECTAL CANCER SCREENING  9/11/2023, normal, repeat in 10 years.     DM on insulin. A1C 7. Knows he needs better control.    COVID VACCINATION Discussed that this is recommended for LT. He declines  OTHER VACCINATION influenza, HAV HBV. He declines    NUTRITIONAL STATUS No " malnutrition    SOCIAL SUPPORT Lives with son, describes nephews in [place]  FUNCTIONAL STATUS Indep in ADLs.  LIVER TRANSPLANT CANDIDACY MELD is low. Will complete eval and will discuss,leonel would not activate.        RTC 2/11/25 in person in [place]. US with doppler at that time.    Judie Roberto MD  Hepatology/Liver Transplantation  Medical Director, Liver Transplantation  [facility]  ========================================================================    Subjective:  Mr. Petersen is a 49 Y M with decompensated alcohol related cirrhosis with EV bleed, ascites/LOPEZ. First diagnosed with liver disease in 2/2020 when he had BLE and he had EV bleeding. He had already stopped alcohol prior to that. MELD 13 ABO O    US 8/2024 showed partial PV thrombosis. CT venogram 9/11/24: Nonocclusive eccentric thrombus within the central superior mesenteric vein and main portal vein. He was started on warfarin in October/November 2024    He states his biggest problem is brain fog. He has not been on any medications.  He gets foggy/dizzy when he bends over and sees spots. He also gets this when he makes sudden movements.  He is on propranolol ER 60 mg.    HE has LOPEZ which is mostly controlled with diuretics. Occasional abdominal swelling when he consumes more sodium    Ascites/LE  spironolactone  100  furosemide 40  Na restricted diet: tries to stay under 2000, gives himself a B+.  Paracentesis: none  HE  Lactulose none  Rifaximin none  AUD  Last ETOH 12/2019. Stopped because it just didn't taste right.  Typical pattern was mostly drank beer, drank 12-18/week, no drug use, DUI X1, did some classes after DUI     HCC screening CT 9/12/24 no mass.  EGD 2/27/24 small varices, HH. Repeat 1 y.  COLONOSCOPY  9/11/2023, normal, repeat in 10 years.   KIDNEY FUNCTION normal  BONE DENSITY no record    Portal vein assessment: see above  Diabetes: yes  Abdominal surgery: none  HCV neg  HBV neg  HIV neg  Proph: does not get  "flu shot    MELD 3.0: 13 at 11/19/2024  7:19 AM  MELD-Na: 15 at 11/19/2024  7:19 AM  Calculated from:  Serum Creatinine: 0.8 mg/dL (Using min of 1 mg/dL) at 11/19/2024  7:19 AM  Serum Sodium: 136 mmol/L at 11/19/2024  7:19 AM  Total Bilirubin: 1 mg/dL at 11/19/2024  7:19 AM  Serum Albumin: 3.8 g/dL (Using max of 3.5 g/dL) at 11/19/2024  7:19 AM  INR(ratio): 1.91 at 11/19/2024  7:19 AM  Age at listing (hypothetical): 49 years  Sex: Male at 11/19/2024  7:19 AM    PAST MEDICAL HISTORY  HLD on Crestor  MONTSE on CPAP  DM2 dx 4/2020, on insulin, A1C 7 9/25/24. Wears a monitor ([employer])  SOCIAL HISTORY  Lives with son age 19, 3 cats  Currently is not working. Last worked December 2019 due to \"brain fog and balance\". Worked as . Not on disability  Smoking: Never  Alcohol: see above  FAMILY HISTORY  M alive, COPD, smoker  F d pancreatic cancer age 73  Sibs: 1 older sister skin cancer, COPD, smoker  Children 18 and 19 dtr and son (2024)  ROS 10 point ROS neg other than the symptoms noted above in the HPI.  EXAM  97/66 80 16 95%  kg, BMI 36  Gen: No acute distress  Head: Normocephalic atraumatic. Poor dentition  Eyes: Sclera anicteric  Neck: No cervical lymphadenopathy  Respiratory: Clear to auscultation bilaterally, no overt wheezing or rales  CV: RRR   Abdomen:  Soft, nontender, nondistended, normal bowel sounds.  Extrem: No edema  Skin: No jaundice, spider angiomata or palmar erythema.  No rashes.   Neuro: Alert and oriented. No asterixis.  Tremor B  MSK: Grossly Intact  Psych: Normal speech, normal affect    Time today in minutes 130  Record review 20  Communication with care team 15  Face to face with patient 55  Documentation 20             Again, thank you for allowing me to participate in the care of your patient.        Sincerely,        Judie Roberto MD  "

## 2024-11-19 NOTE — LETTER
"11/19/2024      Toro Petersen  [address]   [address]  [place] [address]      Dear Colleague,    Thank you for referring your patient, Toro Petersen, to the [facility] TRANSPLANT [facility]INIC. Please see a copy of my visit note below.    \"Much or all of the text in this note was generated through the use of Dragon Dictate voice to text software. Errors in spelling or words which appear to be out of context are unintentional, may be present due having escaped editing\"    Assessment and Plan:  1. liver transplant evaluation - patient is a good candidate overall. Benefits and surgical risks of a liver transplantation were discussed.  2.  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 Portal Vein:Portal vein thrombus - partial, on anticoagulation and SMV open  2. Hepatic Artery: Open  3. TIPS: absent  4. Previous Abdominal Surgery: No  5. Hepatocellular Carcinoma: None  6. Ascites: Present - minimal  7. Costal Angle: wide  8. Portopulmonary Hypertension: absent  9. Hepatopulmonary Syndrome: absent  10. Cardiac Evaluation: needs stress echocardiogram  11. Nutritional Status: Moderate  12. Diabetes: yes, type II on insulin  13.Hypertension no  14. Smoker:no  14: Fraility index:no  15. Meets guidelines to receive Living Donor  No, due his weight >110 Kg  16. Potential Living donors No  MELD 3.0: 13 at 11/19/2024  7:19 AM  MELD-Na: 15 at 11/19/2024  7:19 AM  Calculated from:  Serum Creatinine: 0.8 mg/dL (Using min of 1 mg/dL) at 11/19/2024  7:19 AM  Serum Sodium: 136 mmol/L at 11/19/2024  7:19 AM  Total Bilirubin: 1 mg/dL at 11/19/2024  7:19 AM  Serum Albumin: 3.8 g/dL (Using max of 3.5 g/dL) at 11/19/2024  7:19 AM  INR(ratio): 1.91 at 11/19/2024  7:19 AM  Age at listing (hypothetical): 49 years  Sex: Male at 11/19/2024  7:19 AM      Recommendations: None  #1.  Full cardiac workup in view of his diabetes and obesity  #2.  Consider losing weight to meet guidelines for living donor transplant  #3.  " His MELD sodium today is 15 and would certainly qualify for listing at this time      Patients overall evaluation will be discussed at the Liver Transplant selection committee meeting with a final recommendation on the patients suitability for transplant to be made at that time.    Consult Full  Details:  Toro Petersen was seen in consultation at the request of Dr. Loi Christine and Dr. Judie Roberto for evaluation as a potential liver transplant recipient.    Reason for Visit:  Toro Petersen is a 49 year old year old male with metabolic associated liver disease, who presents for liver transplant evaluation.    HPI:  Presenting complaint: Encephatopathy     Patient was diagnosed to have liver disease approximately in January 2024.  He was seen for variceal bleeding.  Initially he received blood transfusions and subsequently Dr. Loi Christine did an endoscopy and banding for him.  He is also noticed encephalopathy which is disabling.  He has very minimal ascites.  No spontaneous bacterial peritonitis.  He does have easy fatigability and weakness.  He is not currently working since 2020.  He feels the liver disease is disabling his quality of life.  He has tried Ozempic in the past with good weight loss.    He does not give any history of chest pain or any symptoms of coronary artery disease.  No history of lung disease.  He is otherwise very active.    He did not have a caregiver at this time but says that he has a son that could certainly help him.      No past medical history on file.  No past surgical history on file.  No past surgical history on file.  No family history on file.  Allergies   Allergen Reactions     Aspirin Difficulty breathing     Prior to Admission medications    Medication Sig Start Date End Date Taking? Authorizing Provider   furosemide (LASIX) 40 MG tablet Take 20 mg by mouth. 4/14/24   Reported, Patient   icosapent ethyl (VASCEPA) 1 g CAPS capsule Take 2,000 mg by mouth. 2/15/24 2/19/25   "Reported, Patient   insulin aspart (NOVOLOG FLEXPEN) 100 UNIT/ML pen INJECT 36 UNITS UNDER THE SKIN 3 TIMES PER DAY BEFORE MEALS 5/16/24   Reported, Patient   insulin glargine U-300 (TOUJEO SOLOSTAR) 300 UNIT/ML (1 units dial) pen INJECT 80 UNITS UNDER THE SKIN EVERY NIGHT AT BEDTIME 4/12/24   Reported, Patient   propranolol ER (INDERAL LA) 60 MG 24 hr capsule Take 1 capsule by mouth daily. 7/18/24   Reported, Patient   rosuvastatin (CRESTOR) 20 MG tablet Take 20 mg by mouth. 8/22/24   Reported, Patient   spironolactone (ALDACTONE) 50 MG tablet Take 50 mg by mouth. 4/14/24   Reported, Patient   Vitamin D, Cholecalciferol, 50 MCG (2000 UT) CAPS Take by mouth.    Reported, Patient   vitamin E (TOCOPHEROL) 1000 units (450 mg) capsule Take 1,000 Units by mouth.    Reported, Patient       Previous Transplant Hx: No    Cardiovascular Hx:       h/o Cardiac Issues: No       Exercise Tolerance: shortness of breath with exertion.    Potential Donor(s): No    ROS:    REVIEW OF SYSTEMS (check box if normal)  [x]                GENERAL  [x]                  PULMONARY [x]                 GENITOURINARY  [x]                 CNS                 [x]                  CARDIAC  [x]                  ENDOCRINE  [x]                 EARS,NOSE,THROAT [x]                  GASTROINTESTINAL [x]                  NEUROLOGIC    [x]                 MUSCLOSKELTAL  [x]                   HEMATOLOGY    Examination:     Vitals:  BP 97/66 (BP Location: Left arm, Patient Position: Sitting, Cuff Size: Adult Large)   Pulse 80   Resp 16   Ht 1.765 m (5' 9.5\")   Wt 112.1 kg (247 lb 1.6 oz)   SpO2 95%   BMI 35.97 kg/m      GENERAL APPEARANCE: alert and no distress  EYES: PERRL  HENT: mouth without ulcers or lesions  NECK: supple, no adenopathy  RESP: lungs clear to auscultation - no rales, rhonchi or wheezes  CV: regular rhythm, normal rate, no rub   ABDOMEN:  soft, nontender, no HSM or masses and bowel sounds normal  MS: extremities normal- no gross " deformities noted, no evidence of inflammation in joints, no muscle tenderness  SKIN: no rash  NEURO: Normal strength and tone, sensory exam grossly normal, mentation intact and speech normal  PSYCH: mentation appears normal. and affect normal/bright      Results:   Recent Results (from the past week)   Lipid Profile    Collection Time: 11/19/24  7:19 AM   Result Value Ref Range    Cholesterol 204 (H) <200 mg/dL    Triglycerides 1,678 (H) <150 mg/dL    Direct Measure HDL 20 (L) >=40 mg/dL    LDL Cholesterol Calculated      Non HDL Cholesterol 184 (H) <130 mg/dL    Patient Fasting > 8hrs? Unknown    Basic metabolic panel    Collection Time: 11/19/24  7:19 AM   Result Value Ref Range    Sodium 136 135 - 145 mmol/L    Potassium 4.6 3.4 - 5.3 mmol/L    Chloride 103 98 - 107 mmol/L    Carbon Dioxide (CO2) 9 (LL) 22 - 29 mmol/L    Anion Gap 24 (H) 7 - 15 mmol/L    Urea Nitrogen 12.1 6.0 - 20.0 mg/dL    Creatinine 0.80 0.67 - 1.17 mg/dL    GFR Estimate >90 >60 mL/min/1.73m2    Calcium 9.0 8.8 - 10.4 mg/dL    Glucose 434 (H) 70 - 99 mg/dL    Patient Fasting > 8hrs? Unknown    Hepatic panel    Collection Time: 11/19/24  7:19 AM   Result Value Ref Range    Protein Total 6.9 6.4 - 8.3 g/dL    Albumin 3.8 3.5 - 5.2 g/dL    Bilirubin Total 1.0 <=1.2 mg/dL    Alkaline Phosphatase 167 (H) 40 - 150 U/L    AST      ALT      Bilirubin Direct     Ferritin    Collection Time: 11/19/24  7:19 AM   Result Value Ref Range    Ferritin 452 (H) 31 - 409 ng/mL   Hemoglobin A1c    Collection Time: 11/19/24  7:19 AM   Result Value Ref Range    Estimated Average Glucose 180 (H) <117 mg/dL    Hemoglobin A1C 7.9 (H) <5.7 %   Iron and iron binding capacity    Collection Time: 11/19/24  7:19 AM   Result Value Ref Range    Iron 82 61 - 157 ug/dL    Iron Binding Capacity      Iron Sat Index     Phosphorus    Collection Time: 11/19/24  7:19 AM   Result Value Ref Range    Phosphorus 3.4 2.5 - 4.5 mg/dL   Prostate spec antigen screen    Collection Time:  11/19/24  7:19 AM   Result Value Ref Range    Prostate Specific Antigen Screen 0.31 0.00 - 3.50 ng/mL   TSH with free T4 reflex    Collection Time: 11/19/24  7:19 AM   Result Value Ref Range    TSH 1.80 0.30 - 4.20 uIU/mL   INR    Collection Time: 11/19/24  7:19 AM   Result Value Ref Range    INR 1.91 (H) 0.85 - 1.15   CBC with platelets    Collection Time: 11/19/24  7:19 AM   Result Value Ref Range    WBC Count 4.6 4.0 - 11.0 10e3/uL    RBC Count 4.44 4.40 - 5.90 10e6/uL    Hemoglobin      Hematocrit 39.3 (L) 40.0 - 53.0 %    MCV 89 78 - 100 fL    MCH      MCHC      RDW 13.0 10.0 - 15.0 %    Platelet Count 52 (L) 150 - 450 10e3/uL   Adult Type and Screen    Collection Time: 11/19/24  7:19 AM   Result Value Ref Range    ABO/RH(D) O POS     Antibody Screen Negative Negative    SPECIMEN EXPIRATION DATE [id number]    ABO and Rh    Collection Time: 11/19/24  7:28 AM   Result Value Ref Range    ABO/RH(D) O POS     SPECIMEN EXPIRATION DATE [id number]      I had a long discussion with the patient regarding liver transplantation which included but was not limited to  the following points:    Liver transplant selection committee process.  The federal rules for cadaveric waiting list, the size and blood type matching of the organ. The availability of living-related donor transplantation.  The types of donors: brain death donors, non-heart beating donors, partial liver grafts: splits and living donor grafts  Extended criteria  Donors (older age, steasosis) and the increased  risk of primary non-function using the extended criteria donors  [facility] high risk donors,  Risk of donor transmitted infections and donor transmitted malignancy  The liver transplant operation and the associated risks and technical complications which can include intraoperative death, post operative death,  Primary non-function, bleeding requiring re-operations, arterial and biliary complications, bowel perforations, and intra abdominal abscess.  Some of these complicaitons may require a second operation.  The postoperative course, the ICU stay and risk of postoperative complications which can include sepsis, MI, stroke, brain injury, pneumonia, pleural effusions, and renal dysfunction.  The current 1 year and 5 year graft and patient survivals.  The need for life long immunosuppressive therapy and the side effects of these medications, including the possibility of toxicity, opportunistic infections, risk of cancer including lymphoma, and the possibility of rejection even if the patient is taking the medication exactly as prescribed.  The need for compliance with medications and follow-up visits in the clinic and thereafter.  The patient and family understand these risks and wish to proceed to transplantation     Review of prior external note(s) from - Outside records from [place]  60 minutes spent by me on the date of the encounter doing chart review, history and exam, documentation and further activities per the note         Again, thank you for allowing me to participate in the care of your patient.        Sincerely,        Ramon Wu MD

## 2024-11-19 NOTE — PROGRESS NOTES
"[facility] Liver Transplant Evaluation    Requesting Provider and Health System: Chris Cohen  Liver Disease: ARLD    A/P  Mr. Petersen 49 year old male with alcohol related cirrhosis. MELD 13 ABO O      HE His CC with liver disease is \"brain fog\". In response to this, I am weaning him off NSBB, adding rifaximin. He already has 4-5 BM per day, so will not add lactulose.    PV THROMBOSIS, PARTIAL stay on warfarin x 3 mo, goal INR 2-3. He will be on for 3 mo then will reassess with US with dopplers.    HYPERTRIGLYCERIDEMIA Marked. On Crestor. Will need follow up/treatment    THROMBOCYTOPENIA 2/2 portal HTN. Plt ~50s.    CRAMPING  1. tonic water (diet)  2. magnesium supplement  3. zinc 220 bid  4. vitamin E 200 mg po tid  If none of these works, will have to cut back on diuretics.    HCC SCREENING UTD CT 9/2024. No mass. US today.  VARICEAL SCREENING UTD with small varices 2/2024. I am stopping his NSBB and he will get next EGD 2/2025.    IRON TESTING Noted ferritin 452, and away from alcohol for 5 y. Sat 11 2/15/24.    PORTAL HTN He has had EV bleed 5 years ago and now with grade 1 varices. Given his symptoms of dizziness, black spots I would like him to wean off the propranolol. He is on Inderal LA 60. I have prescribed inderal 20 mg (not long acting) and have instructed him to take 20 bid for 1 week, then decrease to 20 mg daily.     BONE HEALTH no assessment. DEXA scheduled 2/2025  DENTAL HEALTH Poor dentition. Last dental visit 2019. Needs dental care.   COLORECTAL CANCER SCREENING  9/11/2023, normal, repeat in 10 years.     DM on insulin. A1C 7. Knows he needs better control. Rec he see endocrinology. Consider GLP1  OBESITY BMI 36 COnsider     COVID VACCINATION Discussed that this is recommended for LT. He declines  OTHER VACCINATION influenza, HAV HBV. He declines    NUTRITIONAL STATUS No malnutrition    SOCIAL SUPPORT Lives with son, describes nephews in [place]  FUNCTIONAL STATUS Indep in " ADLs.  LIVER TRANSPLANT CANDIDACY MELD is low. Will complete eval and will discuss,leonel would not activate.        RTC 2/11/25 in person in [place]. US with doppler at that time.    Judie Roberto MD  Hepatology/Liver Transplantation  Medical Director, Liver Transplantation  [facility]  ========================================================================    Subjective:  Mr. Petersen is a 49 Y M with decompensated alcohol related cirrhosis with EV bleed, ascites/LOPEZ. First diagnosed with liver disease in 2/2020 when he had BLE and he had EV bleeding. He had already stopped alcohol prior to that. MELD 13 ABO O    US 8/2024 showed partial PV thrombosis. CT venogram 9/11/24: Nonocclusive eccentric thrombus within the central superior mesenteric vein and main portal vein. He was started on warfarin in October/November 2024    He states his biggest problem is brain fog. He has not been on any medications.  He gets foggy/dizzy when he bends over and sees spots. He also gets this when he makes sudden movements.  He is on propranolol ER 60 mg.    HE has LOPEZ which is mostly controlled with diuretics. Occasional abdominal swelling when he consumes more sodium    Ascites/LE  spironolactone  100  furosemide 40  Na restricted diet: tries to stay under 2000, gives himself a B+.  Paracentesis: none  HE  Lactulose none  Rifaximin none  AUD  Last ETOH 12/2019. Stopped because it just didn't taste right.  Typical pattern was mostly drank beer, drank 12-18/week, no drug use, DUI X1, did some classes after DUI     HCC screening CT 9/12/24 no mass.  EGD 2/27/24 small varices, HH. Repeat 1 y.  COLONOSCOPY  9/11/2023, normal, repeat in 10 years.   KIDNEY FUNCTION normal  BONE DENSITY no record    Portal vein assessment: see above  Diabetes: yes  Abdominal surgery: none  HCV neg  HBV neg  HIV neg  Proph: does not get flu shot    MELD 3.0: 13 at 11/19/2024  7:19 AM  MELD-Na: 15 at 11/19/2024  7:19 AM  Calculated  "from:  Serum Creatinine: 0.8 mg/dL (Using min of 1 mg/dL) at 11/19/2024  7:19 AM  Serum Sodium: 136 mmol/L at 11/19/2024  7:19 AM  Total Bilirubin: 1 mg/dL at 11/19/2024  7:19 AM  Serum Albumin: 3.8 g/dL (Using max of 3.5 g/dL) at 11/19/2024  7:19 AM  INR(ratio): 1.91 at 11/19/2024  7:19 AM  Age at listing (hypothetical): 49 years  Sex: Male at 11/19/2024  7:19 AM    PAST MEDICAL HISTORY  HLD on Crestor  MONTSE on CPAP  DM2 dx 4/2020, on insulin, A1C 7 9/25/24. Wears a monitor ([employer] [facility] [employer])  SOCIAL HISTORY  Lives with son age 19, 3 cats  Currently is not working. Last worked December 2019 due to \"brain fog and balance\". Worked as . Not on disability  Smoking: Never  Alcohol: see above  FAMILY HISTORY  M alive, COPD, smoker  F d pancreatic cancer age 73  Sibs: 1 older sister skin cancer, COPD, smoker  Children 18 and 19 dtr and son (2024)  ROS 10 point ROS neg other than the symptoms noted above in the HPI.  EXAM  97/66 80 16 95%  kg, BMI 36  Gen: No acute distress  Head: Normocephalic atraumatic. Poor dentition  Eyes: Sclera anicteric  Neck: No cervical lymphadenopathy  Respiratory: Clear to auscultation bilaterally, no overt wheezing or rales  CV: RRR   Abdomen:  Soft, nontender, nondistended, normal bowel sounds.  Extrem: No edema  Skin: No jaundice, spider angiomata or palmar erythema.  No rashes.   Neuro: Alert and oriented. No asterixis.  Tremor B  MSK: Grossly Intact  Psych: Normal speech, normal affect    Time today in minutes 130  Record review 20  Communication with care team 15  Face to face with patient 55  Documentation 20         "

## 2024-11-19 NOTE — PATIENT INSTRUCTIONS
"Nutrition & Frailty for Transplant Recipients  With end-stage organ disease, you are at higher risk for malnutrition, deconditioning, frailty, and a reduced functional status. The goal is to have a well-maintained nutritional status (or a good nutritional reserve). Once you \"lose\" your nutritional buffer or functional status, it is very hard to get back. The more functional you are, the better you will do with recovering from surgery, etc.     Functional Status:  - Combined with good nutrition, maintaining functional status will help keep you strong enough for surgery.   - Do what activity you can tolerate. Please ask your Physician for a Physical Therapy referral if you would find this helpful.     Nutrition:  - Protein foods are the building blocks of muscle. Try to get protein at each meal and snack time. Including protein before bed is also helpful to carry you through the night when your muscle is more prone to breakdown.   - Sometimes eating protein is more work than drinking liquids. If this is the case, please supplement with a pre-made protein shake or make your own smoothie with protein powder, milk/water, yogurt, fruit, nuts/janay seeds, etc  - Small volume protein supplements: Prostat or Liquacel (can be found on Amazon). 1 ounce drink has 15 grams of protein.    Sources of Protein  For animal proteins (chicken, beef, fish), deck of cards size is approximately 3 ounces or 24 grams protein.  Food Portion  Grams of Protein   Animal proteins (chicken, turkey, venison, fish/seafood, red meat) 1 ounce  7-9   Egg  1  6   Cottage cheese  1/4 cup  7    Cheese  1 ounce (1 slice, 1 cheese stick) 6   Milk (cow's) 4 ounces or 1/2 cup  4   Dry skim milk powder 2 Tbsp  4   Ricotta cheese  1/4 cup  7    [place] Instant Breakfast (made with milk) 1/2 cup  7   Pudding 1/2 cup  4   Yogurt (ie Yoplait) 1/2 cup  5   Greek yogurt 5 oz container 15   Tofu  1/4 cup  5   Soymilk  1/2 cup  3   Tempeh  1/4 cup  8   Lentils  1/4 " cup 5   Kidney beans, black beans, etc 1/4 cup  4   Chickpeas 1/4 cup  4   Veggie burger  1 sander  7   Soy nuts  1/4 cup  17   Sunflower seeds  2 Tbsp  8   Peanuts  1 ounce 7   Almonds  15 6   Pistachios 25 6   Peanut/almond butter 2 Tbsp  8    My favorite protein bars are Atkins wafer cookie or Power Crunch protein bars- wafer cookie type texture.     Liver Health & Nutrition      Visit https://cirrhosiscare.ca to review valuable information about nutrition and exercise     Nutrition  - For patients with cirrhosis, it is very important to eat the right types and amounts of foods.  We recommend a diet low in carbohydrates/sugars and high in fresh fruits/vegetables, with the right amount of protein.  We typically recommend  grams of protein every day. Your Registered Dietitian will be able to calculate your estimated protein needs based on your weight and degree of muscle wasting associated with your liver disease.  - In regard to protein intake, you can focus on: red meat, poultry, cooked fish and seafood, vegetable-based protein meat products/meat substitutes (Beyond Burgers, etc), tofu and other soy products, eggs, beans/legumes, dairy products (including milk and yogurt and cheese), unsalted nuts, nut butters, etc.  - You should eat at least three meals a day and three to four snacks between meals. Your goal is to include protein at each meal and snack. Focus on eating protein first, then if still hungry, go for the vegetables, fruit, starches, etc.   - Bedtime snacks are especially important (preferrably something with some protein) - toast or fruit with peanut butter, spoonful of peanut butter, Greek yogurt, handful of nuts, string cheese stick, boiled egg, protein bar  - Patients with malnutrition and/or loss of muscle mass can improve their nutrition and muscle mass by drinking at least 2 protein drinks per day. A good time of day to drink one of these is before bed, when your body is preparing to be in  "the \"fasted\" state all night and muscle mass may break down more readily. Some options include: Ensure, Boost, Glucerna, Premier Protein, FairLife, Orgain, or powdered whey protein (or similar supplements) mixed with milk, yogurt, fruit, peanut butter, etc.   - Clear protein drinks: Premier Protein Clear, Ensure Clear, [id number], Boost Breeze Clear, Wicked Whey Protein Powder  - Please avoid eating raw seafood, especially shellfish, because of risk of serious illness  - We recommend all patients with cirrhosis limit their daily sodium intake to less than 2000 mg. Consider tracking your current sodium intake for 3 days on pen/paper or with an chacha like iVengo to understand where most of your sodium is coming from, what you might need to modify in your diet to remain within the 2000 mg/day budget, etc. Consider a lower sodium frozen meal (Healthy Choice, Lean Cuisine, Smart Ones) in a pinch.     Online low sodium store:   https://MediaHound/    Low Sodium Recipe Blogs/Websites     https://Scripped.us/recipe-index/    https://Nexis Vision.AMIA Systems/category/dinner-recipes/    https://www.BabyFirstTV/recipes    https://NexSteppe/recipes    Yumiko GOODSON CCTD  Transplant Dietitian         "

## 2024-11-20 LAB
ATRIAL RATE - MUSE: 73 BPM
DIASTOLIC BLOOD PRESSURE - MUSE: NORMAL MMHG
GAMMA INTERFERON BACKGROUND BLD IA-ACNC: 0.04 IU/ML
INTERPRETATION ECG - MUSE: NORMAL
M TB IFN-G BLD-IMP: NEGATIVE
M TB IFN-G CD4+ BCKGRND COR BLD-ACNC: 7.51 IU/ML
MITOGEN IGNF BCKGRD COR BLD-ACNC: 0 IU/ML
MITOGEN IGNF BCKGRD COR BLD-ACNC: 0 IU/ML
P AXIS - MUSE: 66 DEGREES
PR INTERVAL - MUSE: 180 MS
QRS DURATION - MUSE: 90 MS
QT - MUSE: 388 MS
QTC - MUSE: 427 MS
QUANTIFERON MITOGEN: 7.55 IU/ML
QUANTIFERON NIL TUBE: 0.04 IU/ML
QUANTIFERON TB1 TUBE: 0.04 IU/ML
QUANTIFERON TB2 TUBE: 0.04
R AXIS - MUSE: 70 DEGREES
SYSTOLIC BLOOD PRESSURE - MUSE: NORMAL MMHG
T AXIS - MUSE: 29 DEGREES
VENTRICULAR RATE- MUSE: 73 BPM

## 2024-11-21 LAB
LABORATORY REPORT: NORMAL
PETH INTERPRETATION: NORMAL
PLPETH BLD-MCNC: <10 NG/ML
POPETH BLD-MCNC: <10 NG/ML

## 2024-12-15 ENCOUNTER — HEALTH MAINTENANCE LETTER (OUTPATIENT)
Age: 49
End: 2024-12-15

## 2024-12-18 ENCOUNTER — DOCUMENTATION ONLY (OUTPATIENT)
Dept: TRANSPLANT | Facility: CLINIC | Age: 49
End: 2024-12-18
Payer: COMMERCIAL

## 2024-12-18 NOTE — PROGRESS NOTES
December 18, 2024 3:21 PM - Feliciano Diaz RN:     This RN called patient, no answer, left voice message with request for call back and contact information provided, want to review schedule of upcoming appointments.

## 2025-02-06 ENCOUNTER — TELEPHONE (OUTPATIENT)
Dept: GASTROENTEROLOGY | Facility: CLINIC | Age: 50
End: 2025-02-06
Payer: COMMERCIAL

## 2025-02-06 NOTE — TELEPHONE ENCOUNTER
Was the patient contacted by phone and reminded of the upcoming visit? Yes    Was the patient instructed to bring a current list of all medications to the appointment or instructed to bring in all medication bottles? Yes, patient verbalized understanding    Is it anticipated the patient will need additional appointments? Yes, has Dexa and ultrasound scheduled. Reviewed prep  for all tests on Monday and Tuesday, he verbally understood and agreed.     Were the additional appointments scheduled? Yes    Was the patient instructed to arrive prior to the appointment time to have ordered labs drawn? Labs being drawn day prior to visit.     Were the needed  orders placed? Yes    Paige Cote, JACKI  2/6/2025 9:56 AM

## 2025-02-10 ENCOUNTER — LAB (OUTPATIENT)
Dept: LAB | Facility: CLINIC | Age: 50
End: 2025-02-10
Payer: COMMERCIAL

## 2025-02-10 ENCOUNTER — ANCILLARY PROCEDURE (OUTPATIENT)
Dept: BONE DENSITY | Facility: CLINIC | Age: 50
End: 2025-02-10
Attending: INTERNAL MEDICINE
Payer: COMMERCIAL

## 2025-02-10 ENCOUNTER — DOCUMENTATION ONLY (OUTPATIENT)
Dept: TRANSPLANT | Facility: CLINIC | Age: 50
End: 2025-02-10

## 2025-02-10 ENCOUNTER — HOSPITAL ENCOUNTER (OUTPATIENT)
Dept: CT IMAGING | Facility: CLINIC | Age: 50
Discharge: HOME | End: 2025-02-10
Attending: INTERNAL MEDICINE | Admitting: INTERNAL MEDICINE
Payer: COMMERCIAL

## 2025-02-10 VITALS — SYSTOLIC BLOOD PRESSURE: 103 MMHG | RESPIRATION RATE: 20 BRPM | DIASTOLIC BLOOD PRESSURE: 58 MMHG | HEART RATE: 72 BPM

## 2025-02-10 DIAGNOSIS — K76.6 PORTAL HYPERTENSION (H): ICD-10-CM

## 2025-02-10 DIAGNOSIS — K70.31 ALCOHOLIC CIRRHOSIS OF LIVER WITH ASCITES (H): ICD-10-CM

## 2025-02-10 DIAGNOSIS — K70.30 ALCOHOLIC CIRRHOSIS OF LIVER (H): ICD-10-CM

## 2025-02-10 LAB
ALBUMIN SERPL BCG-MCNC: 4 G/DL (ref 3.5–5.2)
ALP SERPL-CCNC: 101 U/L (ref 40–150)
ALT SERPL W P-5'-P-CCNC: 44 U/L (ref 0–70)
ANION GAP SERPL CALCULATED.3IONS-SCNC: 18 MMOL/L (ref 7–15)
AST SERPL W P-5'-P-CCNC: 55 U/L (ref 0–45)
BILIRUB DIRECT SERPL-MCNC: 0.36 MG/DL (ref 0–0.3)
BILIRUB SERPL-MCNC: 1.4 MG/DL
BUN SERPL-MCNC: 16.2 MG/DL (ref 6–20)
CALCIUM SERPL-MCNC: 8.4 MG/DL (ref 8.8–10.4)
CHLORIDE SERPL-SCNC: 104 MMOL/L (ref 98–107)
CREAT SERPL-MCNC: 0.9 MG/DL (ref 0.67–1.17)
EGFRCR SERPLBLD CKD-EPI 2021: >90 ML/MIN/1.73M2
ERYTHROCYTE [DISTWIDTH] IN BLOOD BY AUTOMATED COUNT: 13 % (ref 10–15)
GLUCOSE SERPL-MCNC: 229 MG/DL (ref 70–99)
HCO3 SERPL-SCNC: 14 MMOL/L (ref 22–29)
HCT VFR BLD AUTO: 40.4 % (ref 40–53)
HGB BLD-MCNC: 14 G/DL (ref 13.3–17.7)
INR PPP: 2.25 (ref 0.85–1.15)
MCH RBC QN AUTO: 31.3 PG (ref 26.5–33)
MCHC RBC AUTO-ENTMCNC: 34.7 G/DL (ref 31.5–36.5)
MCV RBC AUTO: 90 FL (ref 78–100)
PLAT MORPH BLD: NORMAL
PLATELET # BLD AUTO: 44 10E3/UL (ref 150–450)
POTASSIUM SERPL-SCNC: 4.3 MMOL/L (ref 3.4–5.3)
PROT SERPL-MCNC: 7.2 G/DL (ref 6.4–8.3)
RBC # BLD AUTO: 4.48 10E6/UL (ref 4.4–5.9)
RBC MORPH BLD: NORMAL
SODIUM SERPL-SCNC: 136 MMOL/L (ref 135–145)
WBC # BLD AUTO: 5.5 10E3/UL (ref 4–11)

## 2025-02-10 PROCEDURE — 77080 DXA BONE DENSITY AXIAL: CPT | Performed by: INTERNAL MEDICINE

## 2025-02-10 PROCEDURE — 250N000009 HC RX 250: Performed by: INTERNAL MEDICINE

## 2025-02-10 PROCEDURE — 82248 BILIRUBIN DIRECT: CPT | Performed by: PATHOLOGY

## 2025-02-10 PROCEDURE — 80053 COMPREHEN METABOLIC PANEL: CPT | Performed by: PATHOLOGY

## 2025-02-10 PROCEDURE — 36415 COLL VENOUS BLD VENIPUNCTURE: CPT | Performed by: PATHOLOGY

## 2025-02-10 PROCEDURE — 75574 CT ANGIO HRT W/3D IMAGE: CPT

## 2025-02-10 PROCEDURE — 85610 PROTHROMBIN TIME: CPT | Performed by: PATHOLOGY

## 2025-02-10 PROCEDURE — 250N000011 HC RX IP 250 OP 636: Performed by: INTERNAL MEDICINE

## 2025-02-10 PROCEDURE — 85027 COMPLETE CBC AUTOMATED: CPT | Performed by: PATHOLOGY

## 2025-02-10 PROCEDURE — 250N000013 HC RX MED GY IP 250 OP 250 PS 637: Performed by: INTERNAL MEDICINE

## 2025-02-10 PROCEDURE — 75574 CT ANGIO HRT W/3D IMAGE: CPT | Mod: 26 | Performed by: INTERNAL MEDICINE

## 2025-02-10 RX ORDER — LIDOCAINE 40 MG/G
CREAM TOPICAL
Status: DISCONTINUED | OUTPATIENT
Start: 2025-02-10 | End: 2025-02-11 | Stop reason: HOSPADM

## 2025-02-10 RX ORDER — IOPAMIDOL 755 MG/ML
120 INJECTION, SOLUTION INTRAVASCULAR ONCE
Status: COMPLETED | OUTPATIENT
Start: 2025-02-10 | End: 2025-02-10

## 2025-02-10 RX ORDER — METOPROLOL TARTRATE 25 MG/1
25-100 TABLET, FILM COATED ORAL
Status: COMPLETED | OUTPATIENT
Start: 2025-02-10 | End: 2025-02-10

## 2025-02-10 RX ORDER — NITROGLYCERIN 0.4 MG/1
.4-.8 TABLET SUBLINGUAL
Status: DISCONTINUED | OUTPATIENT
Start: 2025-02-10 | End: 2025-02-11 | Stop reason: HOSPADM

## 2025-02-10 RX ORDER — IVABRADINE 5 MG/1
5-15 TABLET, FILM COATED ORAL
Status: COMPLETED | OUTPATIENT
Start: 2025-02-10 | End: 2025-02-10

## 2025-02-10 RX ORDER — DILTIAZEM HYDROCHLORIDE 5 MG/ML
10-15 INJECTION INTRAVENOUS
Status: DISCONTINUED | OUTPATIENT
Start: 2025-02-10 | End: 2025-02-11 | Stop reason: HOSPADM

## 2025-02-10 RX ORDER — ONDANSETRON 2 MG/ML
4 INJECTION INTRAMUSCULAR; INTRAVENOUS
Status: DISCONTINUED | OUTPATIENT
Start: 2025-02-10 | End: 2025-02-11 | Stop reason: HOSPADM

## 2025-02-10 RX ORDER — DILTIAZEM HYDROCHLORIDE 120 MG/1
120 TABLET, FILM COATED ORAL
Status: DISCONTINUED | OUTPATIENT
Start: 2025-02-10 | End: 2025-02-11 | Stop reason: HOSPADM

## 2025-02-10 RX ORDER — METOPROLOL TARTRATE 1 MG/ML
5-20 INJECTION, SOLUTION INTRAVENOUS
Status: DISCONTINUED | OUTPATIENT
Start: 2025-02-10 | End: 2025-02-11 | Stop reason: HOSPADM

## 2025-02-10 RX ADMIN — NITROGLYCERIN 0.8 MG: 0.4 TABLET SUBLINGUAL at 14:35

## 2025-02-10 RX ADMIN — METOPROLOL TARTRATE 20 MG: 5 INJECTION INTRAVENOUS at 14:30

## 2025-02-10 RX ADMIN — METOPROLOL TARTRATE 100 MG: 100 TABLET, FILM COATED ORAL at 13:27

## 2025-02-10 RX ADMIN — IVABRADINE 15 MG: 5 TABLET ORAL at 13:26

## 2025-02-10 RX ADMIN — IOPAMIDOL 120 ML: 755 INJECTION, SOLUTION INTRAVENOUS at 14:28

## 2025-02-10 NOTE — PROGRESS NOTES
Pt arrived for Coronary CT angiogram. Test, meds and side effects reviewed with pt. Resting HR  bpm. Given 100 mg PO Metoprolol + 15 mg PO Ivabradine per verbal order. Administered 0.8 mg SL nitro and 20 mg IV metoprolol on CTA table per order. CTA completed. Patient tolerated procedure well and denies symptoms of allergic reaction. Post monitoring completed and VSS. D/C instructions reviewed with pt whom verbalized understanding of need to increase PO fluids today. D/C to gold waiting room accompanied by staff.

## 2025-02-11 ENCOUNTER — OFFICE VISIT (OUTPATIENT)
Dept: CARDIOLOGY | Facility: CLINIC | Age: 50
End: 2025-02-11
Attending: INTERNAL MEDICINE
Payer: COMMERCIAL

## 2025-02-11 ENCOUNTER — OFFICE VISIT (OUTPATIENT)
Dept: GASTROENTEROLOGY | Facility: CLINIC | Age: 50
End: 2025-02-11
Attending: INTERNAL MEDICINE
Payer: COMMERCIAL

## 2025-02-11 ENCOUNTER — COMMITTEE REVIEW (OUTPATIENT)
Dept: TRANSPLANT | Facility: CLINIC | Age: 50
End: 2025-02-11

## 2025-02-11 ENCOUNTER — TELEPHONE (OUTPATIENT)
Dept: TRANSPLANT | Facility: CLINIC | Age: 50
End: 2025-02-11

## 2025-02-11 VITALS
RESPIRATION RATE: 18 BRPM | HEART RATE: 70 BPM | TEMPERATURE: 97.5 F | HEIGHT: 68 IN | WEIGHT: 242.95 LBS | SYSTOLIC BLOOD PRESSURE: 120 MMHG | DIASTOLIC BLOOD PRESSURE: 74 MMHG | OXYGEN SATURATION: 96 % | BODY MASS INDEX: 36.82 KG/M2

## 2025-02-11 VITALS
WEIGHT: 243 LBS | OXYGEN SATURATION: 96 % | HEART RATE: 72 BPM | SYSTOLIC BLOOD PRESSURE: 106 MMHG | BODY MASS INDEX: 35.37 KG/M2 | DIASTOLIC BLOOD PRESSURE: 71 MMHG

## 2025-02-11 DIAGNOSIS — K70.30 ALCOHOLIC CIRRHOSIS OF LIVER WITHOUT ASCITES (H): Primary | ICD-10-CM

## 2025-02-11 DIAGNOSIS — Z01.818 ENCOUNTER FOR PRE-TRANSPLANT EVALUATION FOR CHRONIC LIVER DISEASE: ICD-10-CM

## 2025-02-11 DIAGNOSIS — K70.31 ALCOHOLIC CIRRHOSIS OF LIVER WITH ASCITES (H): ICD-10-CM

## 2025-02-11 PROCEDURE — 99204 OFFICE O/P NEW MOD 45 MIN: CPT | Performed by: INTERNAL MEDICINE

## 2025-02-11 PROCEDURE — G0463 HOSPITAL OUTPT CLINIC VISIT: HCPCS | Performed by: INTERNAL MEDICINE

## 2025-02-11 PROCEDURE — 99215 OFFICE O/P EST HI 40 MIN: CPT | Performed by: INTERNAL MEDICINE

## 2025-02-11 PROCEDURE — 99417 PROLNG OP E/M EACH 15 MIN: CPT | Performed by: INTERNAL MEDICINE

## 2025-02-11 RX ORDER — CHLORAL HYDRATE 500 MG
2 CAPSULE ORAL DAILY
COMMUNITY

## 2025-02-11 RX ORDER — WARFARIN SODIUM 1 MG/1
TABLET ORAL DAILY
COMMUNITY

## 2025-02-11 ASSESSMENT — PAIN SCALES - GENERAL
PAINLEVEL_OUTOF10: NO PAIN (0)
PAINLEVEL_OUTOF10: MILD PAIN (3)

## 2025-02-11 NOTE — PROGRESS NOTES
"[facility] Liver Transplant Evaluation    Requesting Provider and Health System: Chris Cohen  Liver Disease: ARLD    A/P  Mr. Petersen 49 year old male with alcohol related cirrhosis. MELD 16 ABO O    HE His CC with liver disease is \"brain fog\". In response to this, I am weaning him off NSBB, adding rifaximin. He already has 4-5 BM per day, so will not add lactulose.    PV THROMBOSIS, PARTIAL stay on warfarin x 3 mo, goal INR 2-3. He has been on for 3 mo and now reassessing with US with dopplers. He had US today, not yet read.    HYPERTRIGLYCERIDEMIA Marked. On Crestor. Will need follow up/treatment    THROMBOCYTOPENIA 2/2 portal HTN. Plt ~50s.    ASCITES/LOPEZ  F 40 and sp 100. This is controlled.     CRAMPING  1. tonic water (diet)  2. magnesium supplement  3. zinc 220 bid  4. vitamin E 200 mg po tid  If none of these works, will have to cut back on diuretics. This is going well    HCC SCREENING UTD CT 9/2024. No mass. US today.  VARICEAL SCREENING UTD with small varices 2/2024. I am stopping his NSBB and he will get next EGD 2/27/25.    IRON TESTING Noted ferritin 452, and away from alcohol for 5 y. Sat 11 2/15/24.    PORTAL HTN He has had EV bleed 5 years ago and now with grade 1 varices. Given his symptoms of dizziness, black spots I would like him to wean off the propranolol. He is on Inderal LA 60. I have prescribed inderal 20 mg (not long acting) and have instructed him to take 20 bid for 1 week, then decrease to 20 mg daily.     BONE HEALTH no assessment. DEXA scheduled 2/2025  DENTAL HEALTH Poor dentition. Last dental visit 2019. Needs dental care.   COLORECTAL CANCER SCREENING  9/11/2023, normal, repeat in 10 years.     DM on insulin. A1C 7. Knows he needs better control. Rec he see endocrinology (seeing 2/25/25)  OBESITY BMI 36 Consider GLP1. He will discuss with endocrinology with whom he has appt 2/25/25.    COVID VACCINATION Discussed that this is recommended for LT. He declines  OTHER " VACCINATION influenza, HAV HBV. He declines    NUTRITIONAL STATUS No malnutrition    SOCIAL SUPPORT Lives with son, describes nephews in [place]. Needs caregiver plan.  FUNCTIONAL STATUS Indep in ADLs.  LIVER TRANSPLANT CANDIDACY MELD is low. Will complete eval and will discuss.    Follow-up 4-6 months in person in [place]     Judie Rboerto MD  Hepatology/Liver Transplantation  Medical Director, Liver Transplantation  [facility]  ========================================================================    Subjective:  Mr. Petersen is a 49 Y M with decompensated alcohol related cirrhosis with EV bleed, ascites/LOPEZ. First diagnosed with liver disease in 2/2020 when he had BLE and he had EV bleeding. He had already stopped alcohol prior to that. MELD 16 ABO O    US 8/2024 showed partial PV thrombosis. CT venogram 9/11/24: Nonocclusive eccentric thrombus within the central superior mesenteric vein and main portal vein. He was started on warfarin in October/November 2024    He states his biggest problem is brain fog. He has not been on any medications.  He reported getting foggy/dizzy when he bends over and sees spots. He also gets this when he makes sudden movements. We stopped his propranolol a few months ago and he has not had any episodes of this since then but states he maybe hasn't done any activity that might precipitate it but he isn't sure.    He was able to get rifaximin and is taking it twice a day. He notices his thinking is clearer.  He notes that he gets lethargic after meals.      Ascites/LE  spironolactone  100  furosemide 40  Na restricted diet: tries to stay under 2000, gives himself a B+.  Paracentesis: none    HE  Lactulose none  Rifaximin now on 550 bid since Dec 2024    AUD  Last ETOH 12/2019. Stopped because it just didn't taste right.  Typical pattern was mostly drank beer, drank 12-18/week, no drug use, DUI X1, did some classes after DUI     HCC screening CT 9/12/24 no mass.  EGD  "2/27/24 small varices, HH. Repeat 1 y.  COLONOSCOPY  9/11/2023, normal, repeat in 10 years.   KIDNEY FUNCTION normal  BONE DENSITY no record    Portal vein assessment: see above  Diabetes: yes  Abdominal surgery: none  HCV neg  HBV neg  HIV neg  Proph: does not get flu shot    MELD 3.0: 16 at 2/10/2025 12:16 PM  MELD-Na: 18 at 2/10/2025 12:16 PM  Calculated from:  Serum Creatinine: 0.9 mg/dL (Using min of 1 mg/dL) at 2/10/2025 12:16 PM  Serum Sodium: 136 mmol/L at 2/10/2025 12:16 PM  Total Bilirubin: 1.4 mg/dL at 2/10/2025 12:16 PM  Serum Albumin: 4 g/dL (Using max of 3.5 g/dL) at 2/10/2025 12:16 PM  INR(ratio): 2.25 at 2/10/2025 12:16 PM  Age at listing (hypothetical): 49 years  Sex: Male at 2/10/2025 12:16 PM    PAST MEDICAL HISTORY  HLD on Crestor  MONTSE on CPAP  DM2 dx 4/2020, on insulin, A1C 7 9/25/24. Wears a monitor ([employer])  SOCIAL HISTORY  Lives with son age 19, 3 cats  Currently is not working. Last worked December 2019 due to \"brain fog and balance\". Worked as . Not on disability  Smoking: Never  Alcohol: see above  FAMILY HISTORY  M alive, COPD, smoker  F d pancreatic cancer age 73  Sibs: 1 older sister skin cancer, COPD, smoker  Children 18 and 19 dtr and son (2024)  ROS 10 point ROS neg other than the symptoms noted above in the HPI.  EXAM    /74 (BP Location: Right arm, Patient Position: Sitting, Cuff Size: Adult Large)   Pulse 70   Temp 97.5  F (36.4  C) (Oral)   Resp 18   Ht 1.736 m (5' 8.35\")   Wt 110.2 kg (242 lb 15.2 oz)   SpO2 96%   BMI 36.57 kg/m      Gen: No acute distress  Head: Normocephalic atraumatic. Poor dentition  Eyes: Sclera anicteric  Neck: No cervical lymphadenopathy  Respiratory: Clear to auscultation bilaterally, no overt wheezing or rales  CV: RRR   Abdomen:  Soft, nontender, nondistended, normal bowel sounds.  Extrem: No edema  Skin: No jaundice, spider angiomata or palmar erythema.  No rashes.   Neuro: Alert and oriented. No asterixis.  Tremor B  MSK: Grossly " Intact  Psych: Normal speech, normal affect    Time today in minutes 130  Record review 20  Communication with care team 15  Face to face with patient 30  Documentation 15

## 2025-02-11 NOTE — NURSING NOTE
Chief Complaint   Patient presents with    New Patient      NEW CARDIOLOGY     Vitals were taken and medications reconciled.    Lenard Zelaya, EMT  8:06 AM

## 2025-02-11 NOTE — PROGRESS NOTES
SUBJECTIVE:  Toro Petersen is a 49 year old male who presents for liver transplant evaluation.  Laënnec cirrhosis.  Currently patient is fairly active with no cardiac symptoms.  No prior cardiac history.  Only current cardiac medication is Crestor.  Diabetic since April 2020.  Current A1c is 7.2.  Non-smoker.  No hypertension.  No family history of premature coronary artery disease.    Patient Active Problem List    Diagnosis Date Noted    Class 2 severe obesity due to excess calories with serious comorbidity in adult (H) 11/19/2024     Priority: Medium    MONTSE on CPAP 07/06/2023     Priority: Medium     Formatting of this note might be different from the original.   PSG 12/28/2022   AHI 6.5   REM AHI 23.2   APAP 5-15cm of water   Wt. 239.6lbs   DME CPAP store   7/6/2023 5-13.5cm of water      Type 2 diabetes mellitus without complication, with long-term current use of insulin (H) 05/20/2020     Priority: Medium    Alcoholic cirrhosis of liver with ascites (H) 01/23/2020     Priority: Medium    Splenomegaly 01/08/2020     Priority: Medium    Secondary esophageal varices without bleeding (H) 01/07/2020     Priority: Medium    Thrombocytopenia 01/06/2020     Priority: Medium    Acute bronchitis 06/01/2016     Priority: Medium    .  Current Outpatient Medications   Medication Sig Dispense Refill    fish oil-omega-3 fatty acids 1000 MG capsule Take 2 g by mouth daily.      furosemide (LASIX) 40 MG tablet Take 20 mg by mouth.      insulin aspart (NOVOLOG FLEXPEN) 100 UNIT/ML pen INJECT 36 UNITS UNDER THE SKIN 3 TIMES PER DAY BEFORE MEALS      insulin glargine U-300 (TOUJEO SOLOSTAR) 300 UNIT/ML (1 units dial) pen INJECT 80 UNITS UNDER THE SKIN EVERY NIGHT AT BEDTIME      rifaximin (XIFAXAN) 550 MG TABS tablet Take 1 tablet (550 mg) by mouth 2 times daily. 60 tablet 5    rosuvastatin (CRESTOR) 20 MG tablet Take 20 mg by mouth.      spironolactone (ALDACTONE) 50 MG tablet Take 50 mg by mouth.      Vitamin D,  Cholecalciferol, 50 MCG (2000 UT) CAPS Take by mouth.      vitamin E (TOCOPHEROL) 1000 units (450 mg) capsule Take 1,000 Units by mouth.      warfarin ANTICOAGULANT (COUMADIN) 1 MG tablet Take by mouth daily.      icosapent ethyl (VASCEPA) 1 g CAPS capsule Take 2,000 mg by mouth.      propranolol ER (INDERAL LA) 60 MG 24 hr capsule Take 1 capsule by mouth daily. (Patient not taking: Reported on 2/11/2025)       No current facility-administered medications for this visit.     No past medical history on file.  No past surgical history on file.  Allergies   Allergen Reactions    Aspirin Difficulty breathing     Social History     Socioeconomic History    Marital status: Single     Spouse name: Not on file    Number of children: Not on file    Years of education: Not on file    Highest education level: Not on file   Occupational History    Not on file   Tobacco Use    Smoking status: Never    Smokeless tobacco: Current     Types: Chew    Tobacco comments:     Chewing tobacco   Substance and Sexual Activity    Alcohol use: Not Currently     Comment: last drink 12/2019    Drug use: Never    Sexual activity: Not on file   Other Topics Concern    Not on file   Social History Narrative    Not on file     Social Drivers of Health     Financial Resource Strain: Not on file   Food Insecurity: Not on file   Transportation Needs: Not on file   Physical Activity: Not on file   Stress: Not on file   Social Connections: Not on file   Interpersonal Safety: Not on file   Housing Stability: Not on file     No family history on file.       REVIEW OF SYSTEMS:  General: negative, fever, chills, night sweats  Skin: negative, acne, rash, and scaling  Eyes: negative, double vision, eye pain, and photophobia  Ears/Nose/Throat: negative, nasal congestion, and purulent rhinorrhea  Respiratory: No dyspnea on exertion, No cough, No hemoptysis, and negative  Cardiovascular: negative, palpitations, tachycardia, irregular heart beat, chest pain,  exertional chest pain or pressure, paroxysmal nocturnal dyspnea, dyspnea on exertion, and orthopnea         OBJECTIVE:  Blood pressure 106/71, pulse 72, weight 110.2 kg (243 lb), SpO2 96%.  General Appearance: healthy, alert, active, and no distress  Head: Normocephalic. No masses, lesions, tenderness or abnormalities  Eyes: conjuctiva clear, PERRL, EOM intact  Ears: External ears normal. Canals clear. TM's normal.  Nose: Nares normal  Mouth: normal  Neck: Supple, no cervical adenopathy, no thyromegaly  Lungs: clear to auscultation  Cardiac: regular rate and rhythm, normal S1 and S2, no murmur         ASSESSMENT/PLAN:  Patient with Laënnec cirrhosis here for liver transplant evaluation.  Patient is fairly active with no cardiac symptoms.  No prior cardiac history.  Only current cardiac medication is Crestor.  No hypertension.  Type 2 diabetes diagnosed in April 2020.  Current A1c 7.2.  Non-smoker.  No family history of premature coronary artery disease.  Reviewed EKG.  Normal sinus rhythm normal EKG.  Echocardiogram reviewed normal biventricular function no significant valvular abnormalities.  Unable to estimate PA pressure.  CT coronary angiogram done yesterday reviewed.  Total calcium score was 0.  No significant coronary artery disease.  Patient can proceed with the transplant.  Total visit duration 45 minutes.  This included face-to-face interview, physical exam, chart review, review of EKG echocardiogram, CT coronary angiogram and documentation.

## 2025-02-11 NOTE — LETTER
2/11/2025      RE: Toro Petersen  [address]  [place] [address]       Dear Colleague,    Thank you for the opportunity to participate in the care of your patient, Toro Petersen, at the [facility] [place] at [facility]. Please see a copy of my visit note below.       SUBJECTIVE:  Toro Petersen is a 49 year old male who presents for liver transplant evaluation.  Laënnec cirrhosis.  Currently patient is fairly active with no cardiac symptoms.  No prior cardiac history.  Only current cardiac medication is Crestor.  Diabetic since April 2020.  Current A1c is 7.2.  Non-smoker.  No hypertension.  No family history of premature coronary artery disease.    Patient Active Problem List    Diagnosis Date Noted     Class 2 severe obesity due to excess calories with serious comorbidity in adult (H) 11/19/2024     Priority: Medium     MONTSE on CPAP 07/06/2023     Priority: Medium     Formatting of this note might be different from the original.   PSG 12/28/2022   AHI 6.5   REM AHI 23.2   APAP 5-15cm of water   Wt. 239.6lbs   DME CPAP store   7/6/2023 5-13.5cm of water       Type 2 diabetes mellitus without complication, with long-term current use of insulin (H) 05/20/2020     Priority: Medium     Alcoholic cirrhosis of liver with ascites (H) 01/23/2020     Priority: Medium     Splenomegaly 01/08/2020     Priority: Medium     Secondary esophageal varices without bleeding (H) 01/07/2020     Priority: Medium     Thrombocytopenia 01/06/2020     Priority: Medium     Acute bronchitis 06/01/2016     Priority: Medium    .  Current Outpatient Medications   Medication Sig Dispense Refill     fish oil-omega-3 fatty acids 1000 MG capsule Take 2 g by mouth daily.       furosemide (LASIX) 40 MG tablet Take 20 mg by mouth.       insulin aspart (NOVOLOG FLEXPEN) 100 UNIT/ML pen INJECT 36 UNITS UNDER THE SKIN 3 TIMES PER DAY BEFORE MEALS       insulin glargine  U-300 (TOUJEO SOLOSTAR) 300 UNIT/ML (1 units dial) pen INJECT 80 UNITS UNDER THE SKIN EVERY NIGHT AT BEDTIME       rifaximin (XIFAXAN) 550 MG TABS tablet Take 1 tablet (550 mg) by mouth 2 times daily. 60 tablet 5     rosuvastatin (CRESTOR) 20 MG tablet Take 20 mg by mouth.       spironolactone (ALDACTONE) 50 MG tablet Take 50 mg by mouth.       Vitamin D, Cholecalciferol, 50 MCG (2000 UT) CAPS Take by mouth.       vitamin E (TOCOPHEROL) 1000 units (450 mg) capsule Take 1,000 Units by mouth.       warfarin ANTICOAGULANT (COUMADIN) 1 MG tablet Take by mouth daily.       icosapent ethyl (VASCEPA) 1 g CAPS capsule Take 2,000 mg by mouth.       propranolol ER (INDERAL LA) 60 MG 24 hr capsule Take 1 capsule by mouth daily. (Patient not taking: Reported on 2/11/2025)       No current facility-administered medications for this visit.     No past medical history on file.  No past surgical history on file.  Allergies   Allergen Reactions     Aspirin Difficulty breathing     Social History     Socioeconomic History     Marital status: Single     Spouse name: Not on file     Number of children: Not on file     Years of education: Not on file     Highest education level: Not on file   Occupational History     Not on file   Tobacco Use     Smoking status: Never     Smokeless tobacco: Current     Types: Chew     Tobacco comments:     Chewing tobacco   Substance and Sexual Activity     Alcohol use: Not Currently     Comment: last drink 12/2019     Drug use: Never     Sexual activity: Not on file   Other Topics Concern     Not on file   Social History Narrative     Not on file     Social Drivers of Health     Financial Resource Strain: Not on file   Food Insecurity: Not on file   Transportation Needs: Not on file   Physical Activity: Not on file   Stress: Not on file   Social Connections: Not on file   Interpersonal Safety: Not on file   Housing Stability: Not on file     No family history on file.       REVIEW OF SYSTEMS:  General:  negative, fever, chills, night sweats  Skin: negative, acne, rash, and scaling  Eyes: negative, double vision, eye pain, and photophobia  Ears/Nose/Throat: negative, nasal congestion, and purulent rhinorrhea  Respiratory: No dyspnea on exertion, No cough, No hemoptysis, and negative  Cardiovascular: negative, palpitations, tachycardia, irregular heart beat, chest pain, exertional chest pain or pressure, paroxysmal nocturnal dyspnea, dyspnea on exertion, and orthopnea         OBJECTIVE:  Blood pressure 106/71, pulse 72, weight 110.2 kg (243 lb), SpO2 96%.  General Appearance: healthy, alert, active, and no distress  Head: Normocephalic. No masses, lesions, tenderness or abnormalities  Eyes: conjuctiva clear, PERRL, EOM intact  Ears: External ears normal. Canals clear. TM's normal.  Nose: Nares normal  Mouth: normal  Neck: Supple, no cervical adenopathy, no thyromegaly  Lungs: clear to auscultation  Cardiac: regular rate and rhythm, normal S1 and S2, no murmur         ASSESSMENT/PLAN:  Patient with Laënnec cirrhosis here for liver transplant evaluation.  Patient is fairly active with no cardiac symptoms.  No prior cardiac history.  Only current cardiac medication is Crestor.  No hypertension.  Type 2 diabetes diagnosed in April 2020.  Current A1c 7.2.  Non-smoker.  No family history of premature coronary artery disease.  Reviewed EKG.  Normal sinus rhythm normal EKG.  Echocardiogram reviewed normal biventricular function no significant valvular abnormalities.  Unable to estimate PA pressure.  CT coronary angiogram done yesterday reviewed.  Total calcium score was 0.  No significant coronary artery disease.  Patient can proceed with the transplant.  Total visit duration 45 minutes.  This included face-to-face interview, physical exam, chart review, review of EKG echocardiogram, CT coronary angiogram and documentation.      Please do not hesitate to contact me if you have any questions/concerns.     Sincerely,     YO MCLEOD  MD Johana

## 2025-02-11 NOTE — LETTER
"2/11/2025      Toro Petersen  [address]  [address]  [place] [address]      Dear Colleague,    Thank you for referring your patient, Toro Petersen, to the [facility] [place]. Please see a copy of my visit note below.    [facility] Liver Transplant Evaluation    Requesting Provider and Health System: Chris Cohen  Liver Disease: ARLD    A/P  Mr. Petersen 49 year old male with alcohol related cirrhosis. MELD 16 ABO O    HE His CC with liver disease is \"brain fog\". In response to this, I am weaning him off NSBB, adding rifaximin. He already has 4-5 BM per day, so will not add lactulose.    PV THROMBOSIS, PARTIAL stay on warfarin x 3 mo, goal INR 2-3. He has been on for 3 mo and now reassessing with US with dopplers. He had US today, not yet read.    HYPERTRIGLYCERIDEMIA Marked. On Crestor. Will need follow up/treatment    THROMBOCYTOPENIA 2/2 portal HTN. Plt ~50s.    ASCITES/LOPEZ  F 40 and sp 100. This is controlled.     CRAMPING  1. tonic water (diet)  2. magnesium supplement  3. zinc 220 bid  4. vitamin E 200 mg po tid  If none of these works, will have to cut back on diuretics. This is going well    HCC SCREENING UTD CT 9/2024. No mass. US today.  VARICEAL SCREENING UTD with small varices 2/2024. I am stopping his NSBB and he will get next EGD 2/27/25.    IRON TESTING Noted ferritin 452, and away from alcohol for 5 y. Sat 11 2/15/24.    PORTAL HTN He has had EV bleed 5 years ago and now with grade 1 varices. Given his symptoms of dizziness, black spots I would like him to wean off the propranolol. He is on Inderal LA 60. I have prescribed inderal 20 mg (not long acting) and have instructed him to take 20 bid for 1 week, then decrease to 20 mg daily.     BONE HEALTH no assessment. DEXA scheduled 2/2025  DENTAL HEALTH Poor dentition. Last dental visit 2019. Needs dental care.   COLORECTAL CANCER SCREENING  9/11/2023, normal, repeat in 10 years.     DM on insulin. A1C " 7. Knows he needs better control. Rec he see endocrinology (seeing 2/25/25)  OBESITY BMI 36 Consider GLP1. He will discuss with endocrinology with whom he has appt 2/25/25.    COVID VACCINATION Discussed that this is recommended for LT. He declines  OTHER VACCINATION influenza, HAV HBV. He declines    NUTRITIONAL STATUS No malnutrition    SOCIAL SUPPORT Lives with son, describes nephews in [place]. Needs caregiver plan.  FUNCTIONAL STATUS Indep in ADLs.  LIVER TRANSPLANT CANDIDACY MELD is low. Will complete eval and will discuss.    Follow-up 4-6 months in person in [place]     Judie Roberto MD  Hepatology/Liver Transplantation  Medical Director, Liver Transplantation  [facility]  ========================================================================    Subjective:  Mr. Petersen is a 49 Y M with decompensated alcohol related cirrhosis with EV bleed, ascites/LOPEZ. First diagnosed with liver disease in 2/2020 when he had BLE and he had EV bleeding. He had already stopped alcohol prior to that. MELD 16 ABO O    US 8/2024 showed partial PV thrombosis. CT venogram 9/11/24: Nonocclusive eccentric thrombus within the central superior mesenteric vein and main portal vein. He was started on warfarin in October/November 2024    He states his biggest problem is brain fog. He has not been on any medications.  He reported getting foggy/dizzy when he bends over and sees spots. He also gets this when he makes sudden movements. We stopped his propranolol a few months ago and he has not had any episodes of this since then but states he maybe hasn't done any activity that might precipitate it but he isn't sure.    He was able to get rifaximin and is taking it twice a day. He notices his thinking is clearer.  He notes that he gets lethargic after meals.      Ascites/LE  spironolactone  100  furosemide 40  Na restricted diet: tries to stay under 2000, gives himself a B+.  Paracentesis: none    HE  Lactulose none  Rifaximin  "now on 550 bid since Dec 2024    AUD  Last ETOH 12/2019. Stopped because it just didn't taste right.  Typical pattern was mostly drank beer, drank 12-18/week, no drug use, DUI X1, did some classes after DUI     HCC screening CT 9/12/24 no mass.  EGD 2/27/24 small varices, HH. Repeat 1 y.  COLONOSCOPY  9/11/2023, normal, repeat in 10 years.   KIDNEY FUNCTION normal  BONE DENSITY no record    Portal vein assessment: see above  Diabetes: yes  Abdominal surgery: none  HCV neg  HBV neg  HIV neg  Proph: does not get flu shot    MELD 3.0: 16 at 2/10/2025 12:16 PM  MELD-Na: 18 at 2/10/2025 12:16 PM  Calculated from:  Serum Creatinine: 0.9 mg/dL (Using min of 1 mg/dL) at 2/10/2025 12:16 PM  Serum Sodium: 136 mmol/L at 2/10/2025 12:16 PM  Total Bilirubin: 1.4 mg/dL at 2/10/2025 12:16 PM  Serum Albumin: 4 g/dL (Using max of 3.5 g/dL) at 2/10/2025 12:16 PM  INR(ratio): 2.25 at 2/10/2025 12:16 PM  Age at listing (hypothetical): 49 years  Sex: Male at 2/10/2025 12:16 PM    PAST MEDICAL HISTORY  HLD on Crestor  MONTSE on CPAP  DM2 dx 4/2020, on insulin, A1C 7 9/25/24. Wears a monitor ([employer])  SOCIAL HISTORY  Lives with son age 19, 3 cats  Currently is not working. Last worked December 2019 due to \"brain fog and balance\". Worked as . Not on disability  Smoking: Never  Alcohol: see above  FAMILY HISTORY  M alive, COPD, smoker  F d pancreatic cancer age 73  Sibs: 1 older sister skin cancer, COPD, smoker  Children 18 and 19 dtr and son (2024)  ROS 10 point ROS neg other than the symptoms noted above in the HPI.  EXAM    /74 (BP Location: Right arm, Patient Position: Sitting, Cuff Size: Adult Large)   Pulse 70   Temp 97.5  F (36.4  C) (Oral)   Resp 18   Ht 1.736 m (5' 8.35\")   Wt 110.2 kg (242 lb 15.2 oz)   SpO2 96%   BMI 36.57 kg/m      Gen: No acute distress  Head: Normocephalic atraumatic. Poor dentition  Eyes: Sclera anicteric  Neck: No cervical lymphadenopathy  Respiratory: Clear to auscultation bilaterally, no " overt wheezing or rales  CV: RRR   Abdomen:  Soft, nontender, nondistended, normal bowel sounds.  Extrem: No edema  Skin: No jaundice, spider angiomata or palmar erythema.  No rashes.   Neuro: Alert and oriented. No asterixis.  Tremor B  MSK: Grossly Intact  Psych: Normal speech, normal affect    Time today in minutes 130  Record review 20  Communication with care team 15  Face to face with patient 30  Documentation 15             Again, thank you for allowing me to participate in the care of your patient.        Sincerely,        Judie Roberto MD    Electronically signed

## 2025-02-11 NOTE — COMMITTEE REVIEW
Liver Committee Review Note     Evaluation Date: 11/19/2024  Committee Review Date: 2/11/2025    Organ being evaluated for: Liver    Transplant Phase: Evaluation  Transplant Status: Active    Transplant Coordinator: Feliciano Diaz  Transplant Surgeon:   Ramon Wu    Referring Physician: Chris Cohen    Primary Diagnosis: Acute Alcohol-Associated Hepatitis With or Without Cirrhosis  Secondary Diagnosis:     Transplant Eligibility: Cirrhosis with MELD, ETOH    Committee Review Decision: Needs Re-presentation    Relative Contraindications: Other, see below    Absolute Contraindications: None    Live Donor: NO - but could be with weight loss     Committee Chair Judie Roberto MD verbally attested to the committee's decision.    Committee Discussion Details: re-discuss    Committee Review Members:  Anesthesiology Lalo Katz, DO   Nutrition Yumiko Mcpherson, RD   Pharmacist Kamila Perez, [facility]    - Clinical Kathy Hightower, MSW, Bonnie Baez MSW, Amy Rodriges, [facility]   Transplant China Diamond, NICHO, Samantha Tate, RN, Regina Licona LPN, Apurva Ferreira, NICHO, Kika Caceres, NICHO, Minnie Ayers, PA-C, Stephanie Corbin, RN, Naila Cochran, RN, Jr Abebe Cee, RN, Tangela Perdomo, APRN CNP, Shereen White, RN, Klaus Mckenzie, RN, Rhonda Coffey, RN   Transplant Hepatology  Yuliya Phillips MD, Judie Roberto MD, BEKA GUPTA MD, Georgette Walton PA-C   Transplant Surgery INGRID BENDER, Ramon Wu MD         Needs to take action on his overall health care (DM control, weight loss)    Amy to confirm care plan with mother tomorrow (2/12/25)    Re-discuss after seeing Dr. Roberto next (3-4 months)

## 2025-02-11 NOTE — TELEPHONE ENCOUNTER
Transplant Social Work Services    Data: Obie is in evaluation for liver transplant  Intervention: I met with Obie in clinic regarding his caregiver plan. He identified his mother, Mary as a primary caregiver. She was not present in clinic and would be available on 2/12 for phone call.   I called Mary and provided caregiver education. She does not work, and would be able to stay locally with him post transplantation for the month. Obie has a friend who lives in [place] which is within our guidelines. Mary would be able to stay there with them.     Assessment: Obie has an adequate caregiver plan for transplant. He is an acceptable candidate from a psychosocial perspective.   Education provided by SW: See above  Plan: I will continue to be available for transplant evaluation.      MARCO Sol, [facility]  Liver Transplant   [facility]  Phone: [phone]

## 2025-02-11 NOTE — NURSING NOTE
"Chief Complaint   Patient presents with    RECHECK     Cirrhosis      Vital signs:  Temp: 97.5  F (36.4  C) Temp src: Oral BP: 120/74 Pulse: 70   Resp: 18 SpO2: 96 %     Height: 173.6 cm (5' 8.35\") (shoe off) Weight: 110.2 kg (242 lb 15.2 oz)  Estimated body mass index is 36.57 kg/m  as calculated from the following:    Height as of this encounter: 1.736 m (5' 8.35\").    Weight as of this encounter: 110.2 kg (242 lb 15.2 oz).      Paige Cote, [facility]  2/11/2025 8:33 AM    "

## 2025-05-13 ENCOUNTER — RESULTS FOLLOW-UP (OUTPATIENT)
Dept: GASTROENTEROLOGY | Facility: CLINIC | Age: 50
End: 2025-05-13

## 2025-05-24 DIAGNOSIS — K70.31 ALCOHOLIC CIRRHOSIS OF LIVER WITH ASCITES (H): ICD-10-CM

## 2025-05-27 RX ORDER — RIFAXIMIN 550 MG/1
550 TABLET ORAL
Qty: 60 TABLET | Refills: 5 | Status: SHIPPED | OUTPATIENT
Start: 2025-05-27

## 2025-06-24 ENCOUNTER — COMMITTEE REVIEW (OUTPATIENT)
Dept: TRANSPLANT | Facility: CLINIC | Age: 50
End: 2025-06-24
Payer: COMMERCIAL

## 2025-06-24 NOTE — COMMITTEE REVIEW
Liver Committee Review Note     Evaluation Date: 11/19/2024  Committee Review Date: 6/24/2025    Organ being evaluated for: Liver    Transplant Phase: Evaluation  Transplant Status: Active    Transplant Coordinator: Abebe Cee Jr.  Transplant Surgeon:   Ramon Wu    Referring Physician: Chris Cohen    Primary Diagnosis: Acute Alcohol-Associated Hepatitis With or Without Cirrhosis  Secondary Diagnosis:     Transplant Eligibility: Cirrhosis with MELD, ETOH    Committee Review Decision: Declined    Relative Contraindications: Other, too well - does not require transplant at this time    Absolute Contraindications: None    Live Donor: TBD in future if transplant needed    Committee Chair Antonietta Rutledge MD verbally attested to the committee's decision.    Committee Discussion Details: close evaluation     Committee Review Members:  Anesthesiology Lalo Katz,    Dietitian Yumiko Mcpherson, RAFAEL   Gastroenterology Naila Cochran, NICHO   Pharmacist Kamila Perez, [facility]    - Clinical Kathy Hightower, MSW, Bonnie Baez, MSW, Amy Rodriges, [facility]   Transplant Samantha Tate, NICHO, Regina Licona LPN, Apurva Ferreira, RN, Adilia Marie, NICHO, Jr Abebe Cee, RN, Tangela Perdomo, APRN CNP, Klaus Mckenzie, RN, Rhonda Coffey, NICHO   Transplant Hepatology  Yuliya Phillips MD, SANDRA WEISS MD, Loi Oscar MD, Judie Roberto MD, Antonietta Rutledge MD, Thomas M. Leventhal, MD   Transplant Surgery Lenard Viera MD, Minnie Ayers PA-C, Ramon Wu MD       Close evaluation as he is doing too well for transplant     Judie Roberto MD    Close eval. MELD 12

## 2025-06-24 NOTE — LETTER
June 25, 2025    Toro Petersen  [address]  [place] [address]    Dear Mr. Petersen,   The purpose of this letter is to let you know that on 6/24/2025 the [facility] liver transplant Multi-Disciplinary Selection Team reviewed the results of your transplant evaluation. Based on the results of your evaluation and the selection criteria used by our program, the decision was made to not list you on the liver transplant list. This is because your are currently doing too well, and therefore do not require a transplant at this time.   Important things you should know:  If you would like to discuss the decision, or if your medical status changes you may schedule a return visits with your doctor by calling [phone] and asking to speak to your transplant coordinator.  We recommend that you continue to follow up with your primary care doctor in order to manage your health concerns.  Please continue to follow with the [facility] GI team, as they will re-refer you for transplant if your liver health were to decline. Please call your coordinator below as well if this happens.   Attached is a letter from [facility] that describes the services and information offered to patients by [facility] and the Organ Procurement and Transplantation Network (OPTN).  Thank you for allowing us to participate in your care.  We wish you well.  Sincerely,    Abebe Cee Jr., BSN, RN  Liver Transplant Coordinator  [phone]    Enclosures: [facility] Letter  cc: Care Team    The Organ Procurement and Transplantation Network Toll-free patient services line:  1-473.425.1332  Your resource for organ transplant information    Staffed 8:30 am - 5:00 pm ET Monday - Friday Leave a message 24/7 to receive a call back    The Organ Procurement and Transplantation Network (OPTN) is the national transplant system. It makes the policies that decide how donated organs are matched to patients waiting for a transplant. The OPTN:    Makes sure donated organs get  matched to people on the transplant waiting list  Tells people about the donation and transplant processes  Makes sure that the public knows about the need for more organ and tissue donations    The OPTN has a free patient services line that you can call to:  Get more information about:  Organ donation and organ transplants  Donation and transplant policies  Get an information kit with:  A list of transplant hospitals  Waiting list information  Talk about any questions you may have about your transplant hospital or organ procurement organization. The staff will do their best to help you or point you to others who may help.  Find out how you can volunteer with the OPTN and help shape transplant policy     The patient services line number is: 9-682-964-5771    Patient services line staff CANNOT answer questions about your own medical care, including:  Waiting list status  Test results  Medical records  You will need to call your transplant hospital for this information.    The following websites have more information about transplantation and donation:    OPTN: https://optn.transplant.hrsa.gov/    For potential living donors and transplant recipients:    Living with transplant: https://www.transplantliving.org/    Living donation process: https://optn.transplant.hrsa.gov/living-donation/    Financial assistance: https://www.livingdonorassistance.org/    Transplantation data: https://www.srtr.org/    Organ donation: https://www.organdonor.gov/    Volunteer with the OPTN: https://optn.transplant.hrsa.gov/get-involved/

## 2025-08-10 ENCOUNTER — HEALTH MAINTENANCE LETTER (OUTPATIENT)
Age: 50
End: 2025-08-10

## (undated) RX ORDER — METOPROLOL TARTRATE 1 MG/ML
INJECTION, SOLUTION INTRAVENOUS
Status: DISPENSED
Start: 2025-02-10

## (undated) RX ORDER — IVABRADINE 5 MG/1
TABLET, FILM COATED ORAL
Status: DISPENSED
Start: 2025-02-10

## (undated) RX ORDER — METOPROLOL TARTRATE 100 MG/1
TABLET ORAL
Status: DISPENSED
Start: 2025-02-10

## (undated) RX ORDER — NITROGLYCERIN 0.4 MG/1
TABLET SUBLINGUAL
Status: DISPENSED
Start: 2025-02-10